# Patient Record
Sex: FEMALE | Race: WHITE | Employment: OTHER | URBAN - METROPOLITAN AREA
[De-identification: names, ages, dates, MRNs, and addresses within clinical notes are randomized per-mention and may not be internally consistent; named-entity substitution may affect disease eponyms.]

---

## 2018-09-11 ENCOUNTER — HOSPITAL ENCOUNTER (OUTPATIENT)
Dept: SURGERY | Age: 78
Discharge: HOME | End: 2018-09-11

## 2018-09-12 VITALS — BODY MASS INDEX: 25.83 KG/M2 | HEIGHT: 65 IN | WEIGHT: 155 LBS

## 2018-09-12 RX ORDER — CHOLECALCIFEROL (VITAMIN D3) 125 MCG
5 CAPSULE ORAL
COMMUNITY

## 2018-09-12 NOTE — PERIOP NOTES
Patient verified name, , and surgery as listed in [facility]. Type 1B surgery, PAT phone assessment complete. Orders were received. Labs per surgeon: none  Labs per anesthesia protocol: none    Last nephrologist office note requested medical records at [facility] nephrology. Patient answered medical/surgical history questions at their best of ability. All prior to admission medications documented in [facility]. Patient instructed to take the following medications the day of surgery according to anesthesia guidelines with a small sip of water: symbicort inhaler, norvasc, synthroid, prilosec and oxybutynin . Hold all vitamins 7 days prior to surgery and NSAIDS 5 days prior to surgery. Medications to be held- vitamins and supplements. Patient instructed on the following:  Arrive at A Entrance, time of arrival to be called the day before by 1700  NPO after midnight including gum, mints, and ice chips  Responsible adult must drive patient to the hospital, stay during surgery, and patient will need supervision 24 hours after anesthesia  Use antibacterial soap in shower the night before surgery and on the morning of surgery  Leave all valuables (money and jewelry) at home but bring insurance card and ID on  DOS  Do not wear make-up, nail polish, lotions, cologne, perfumes, powders, or oil on skin. Patient teach back successful and patient demonstrates knowledge of instruction.

## 2018-09-13 ENCOUNTER — ANESTHESIA EVENT (OUTPATIENT)
Dept: SURGERY | Age: 78
End: 2018-09-13
Payer: MEDICARE

## 2018-09-14 ENCOUNTER — ANESTHESIA (OUTPATIENT)
Dept: SURGERY | Age: 78
End: 2018-09-14
Payer: MEDICARE

## 2018-09-14 ENCOUNTER — HOSPITAL ENCOUNTER (OUTPATIENT)
Age: 78
Setting detail: OUTPATIENT SURGERY
Discharge: HOME | End: 2018-09-14
Attending: ORTHOPAEDIC SURGERY | Admitting: ORTHOPAEDIC SURGERY
Payer: MEDICARE

## 2018-09-14 VITALS
WEIGHT: 154 LBS | BODY MASS INDEX: 25.63 KG/M2 | DIASTOLIC BLOOD PRESSURE: 59 MMHG | TEMPERATURE: 97.3 F | SYSTOLIC BLOOD PRESSURE: 120 MMHG | RESPIRATION RATE: 14 BRPM | HEART RATE: 79 BPM | OXYGEN SATURATION: 95 %

## 2018-09-14 PROCEDURE — 77030004434 HC BUR RND STRY -B: Performed by: ORTHOPAEDIC SURGERY

## 2018-09-14 PROCEDURE — 77030006788 HC BLD SAW OSC STRY -B: Performed by: ORTHOPAEDIC SURGERY

## 2018-09-14 PROCEDURE — A4565 SLINGS: HCPCS | Performed by: ORTHOPAEDIC SURGERY

## 2018-09-14 PROCEDURE — 74011250636 HC RX REV CODE- 250/636: Performed by: ANESTHESIOLOGY

## 2018-09-14 PROCEDURE — 74011250636 HC RX REV CODE- 250/636

## 2018-09-14 PROCEDURE — 74011000250 HC RX REV CODE- 250

## 2018-09-14 PROCEDURE — 76060000034 HC ANESTHESIA 1.5 TO 2 HR: Performed by: ORTHOPAEDIC SURGERY

## 2018-09-14 PROCEDURE — C1713 ANCHOR/SCREW BN/BN,TIS/BN: HCPCS | Performed by: ORTHOPAEDIC SURGERY

## 2018-09-14 PROCEDURE — 77030008703 HC TU ET UNCUF COVD -A: Performed by: ANESTHESIOLOGY

## 2018-09-14 PROCEDURE — 77030039266 HC ADH SKN EXOFIN S2SG -A: Performed by: ORTHOPAEDIC SURGERY

## 2018-09-14 PROCEDURE — 76010000162 HC OR TIME 1.5 TO 2 HR INTENSV-TIER 1: Performed by: ORTHOPAEDIC SURGERY

## 2018-09-14 PROCEDURE — 77030039425 HC BLD LARYNG TRULITE DISP TELE -A: Performed by: ANESTHESIOLOGY

## 2018-09-14 PROCEDURE — 74011250637 HC RX REV CODE- 250/637: Performed by: ANESTHESIOLOGY

## 2018-09-14 PROCEDURE — 77030002933 HC SUT MCRYL J&J -A: Performed by: ORTHOPAEDIC SURGERY

## 2018-09-14 PROCEDURE — 77030020782 HC GWN BAIR PAWS FLX 3M -B: Performed by: ANESTHESIOLOGY

## 2018-09-14 PROCEDURE — 76210000021 HC REC RM PH II 0.5 TO 1 HR: Performed by: ORTHOPAEDIC SURGERY

## 2018-09-14 PROCEDURE — 77030018836 HC SOL IRR NACL ICUM -A: Performed by: ORTHOPAEDIC SURGERY

## 2018-09-14 PROCEDURE — 74011000250 HC RX REV CODE- 250: Performed by: ORTHOPAEDIC SURGERY

## 2018-09-14 PROCEDURE — 77030002913 HC SUT ETHBND J&J -B: Performed by: ORTHOPAEDIC SURGERY

## 2018-09-14 PROCEDURE — 76210000016 HC OR PH I REC 1 TO 1.5 HR: Performed by: ORTHOPAEDIC SURGERY

## 2018-09-14 PROCEDURE — 77030003602 HC NDL NRV BLK BBMI -B: Performed by: ANESTHESIOLOGY

## 2018-09-14 PROCEDURE — 77030031139 HC SUT VCRL2 J&J -A: Performed by: ORTHOPAEDIC SURGERY

## 2018-09-14 DEVICE — TWINFIX ULTRA 4.5 MM PK SUTURE                                    ANCHOR WITH TWO NO.2 ULTRABRAID                                    SUTURES BLUE, BLUE-COBRAID WITH NEEDLES
Type: IMPLANTABLE DEVICE | Site: SHOULDER | Status: FUNCTIONAL
Brand: TWINFIX

## 2018-09-14 RX ORDER — PROPOFOL 10 MG/ML
INJECTION, EMULSION INTRAVENOUS AS NEEDED
Status: DISCONTINUED | OUTPATIENT
Start: 2018-09-14 | End: 2018-09-14 | Stop reason: HOSPADM

## 2018-09-14 RX ORDER — LIDOCAINE HYDROCHLORIDE 20 MG/ML
INJECTION, SOLUTION EPIDURAL; INFILTRATION; INTRACAUDAL; PERINEURAL AS NEEDED
Status: DISCONTINUED | OUTPATIENT
Start: 2018-09-14 | End: 2018-09-14 | Stop reason: HOSPADM

## 2018-09-14 RX ORDER — BUPIVACAINE HYDROCHLORIDE AND EPINEPHRINE 5; 5 MG/ML; UG/ML
INJECTION, SOLUTION EPIDURAL; INTRACAUDAL; PERINEURAL AS NEEDED
Status: DISCONTINUED | OUTPATIENT
Start: 2018-09-14 | End: 2018-09-14 | Stop reason: HOSPADM

## 2018-09-14 RX ORDER — LIDOCAINE HYDROCHLORIDE 10 MG/ML
0.1 INJECTION INFILTRATION; PERINEURAL AS NEEDED
Status: DISCONTINUED | OUTPATIENT
Start: 2018-09-14 | End: 2018-09-14 | Stop reason: HOSPADM

## 2018-09-14 RX ORDER — MIDAZOLAM HYDROCHLORIDE 1 MG/ML
2 INJECTION, SOLUTION INTRAMUSCULAR; INTRAVENOUS
Status: COMPLETED | OUTPATIENT
Start: 2018-09-14 | End: 2018-09-14

## 2018-09-14 RX ORDER — ONDANSETRON 2 MG/ML
INJECTION INTRAMUSCULAR; INTRAVENOUS AS NEEDED
Status: DISCONTINUED | OUTPATIENT
Start: 2018-09-14 | End: 2018-09-14 | Stop reason: HOSPADM

## 2018-09-14 RX ORDER — SODIUM CHLORIDE 0.9 % (FLUSH) 0.9 %
5-10 SYRINGE (ML) INJECTION EVERY 8 HOURS
Status: DISCONTINUED | OUTPATIENT
Start: 2018-09-14 | End: 2018-09-14 | Stop reason: HOSPADM

## 2018-09-14 RX ORDER — ALBUTEROL SULFATE 0.83 MG/ML
2.5 SOLUTION RESPIRATORY (INHALATION) AS NEEDED
Status: DISCONTINUED | OUTPATIENT
Start: 2018-09-14 | End: 2018-09-14 | Stop reason: HOSPADM

## 2018-09-14 RX ORDER — DEXAMETHASONE SODIUM PHOSPHATE 4 MG/ML
INJECTION, SOLUTION INTRA-ARTICULAR; INTRALESIONAL; INTRAMUSCULAR; INTRAVENOUS; SOFT TISSUE AS NEEDED
Status: DISCONTINUED | OUTPATIENT
Start: 2018-09-14 | End: 2018-09-14 | Stop reason: HOSPADM

## 2018-09-14 RX ORDER — ONDANSETRON 2 MG/ML
4 INJECTION INTRAMUSCULAR; INTRAVENOUS
Status: DISCONTINUED | OUTPATIENT
Start: 2018-09-14 | End: 2018-09-14 | Stop reason: HOSPADM

## 2018-09-14 RX ORDER — HYDROMORPHONE HYDROCHLORIDE 2 MG/ML
0.5 INJECTION, SOLUTION INTRAMUSCULAR; INTRAVENOUS; SUBCUTANEOUS
Status: DISCONTINUED | OUTPATIENT
Start: 2018-09-14 | End: 2018-09-14 | Stop reason: HOSPADM

## 2018-09-14 RX ORDER — SODIUM CHLORIDE 0.9 % (FLUSH) 0.9 %
5-10 SYRINGE (ML) INJECTION AS NEEDED
Status: DISCONTINUED | OUTPATIENT
Start: 2018-09-14 | End: 2018-09-14 | Stop reason: HOSPADM

## 2018-09-14 RX ORDER — SODIUM CHLORIDE, SODIUM LACTATE, POTASSIUM CHLORIDE, CALCIUM CHLORIDE 600; 310; 30; 20 MG/100ML; MG/100ML; MG/100ML; MG/100ML
1000 INJECTION, SOLUTION INTRAVENOUS CONTINUOUS
Status: DISCONTINUED | OUTPATIENT
Start: 2018-09-14 | End: 2018-09-14 | Stop reason: HOSPADM

## 2018-09-14 RX ORDER — SUCCINYLCHOLINE CHLORIDE 20 MG/ML
INJECTION INTRAMUSCULAR; INTRAVENOUS AS NEEDED
Status: DISCONTINUED | OUTPATIENT
Start: 2018-09-14 | End: 2018-09-14 | Stop reason: HOSPADM

## 2018-09-14 RX ORDER — FENTANYL CITRATE 50 UG/ML
INJECTION, SOLUTION INTRAMUSCULAR; INTRAVENOUS AS NEEDED
Status: DISCONTINUED | OUTPATIENT
Start: 2018-09-14 | End: 2018-09-14 | Stop reason: HOSPADM

## 2018-09-14 RX ORDER — ROCURONIUM BROMIDE 10 MG/ML
INJECTION, SOLUTION INTRAVENOUS AS NEEDED
Status: DISCONTINUED | OUTPATIENT
Start: 2018-09-14 | End: 2018-09-14 | Stop reason: HOSPADM

## 2018-09-14 RX ORDER — ACETAMINOPHEN 500 MG
1000 TABLET ORAL ONCE
Status: COMPLETED | OUTPATIENT
Start: 2018-09-14 | End: 2018-09-14

## 2018-09-14 RX ADMIN — DEXAMETHASONE SODIUM PHOSPHATE 4 MG: 4 INJECTION, SOLUTION INTRA-ARTICULAR; INTRALESIONAL; INTRAMUSCULAR; INTRAVENOUS; SOFT TISSUE at 11:13

## 2018-09-14 RX ADMIN — LIDOCAINE HYDROCHLORIDE 0.1 ML: 10 INJECTION, SOLUTION INFILTRATION; PERINEURAL at 09:47

## 2018-09-14 RX ADMIN — SUCCINYLCHOLINE CHLORIDE 100 MG: 20 INJECTION INTRAMUSCULAR; INTRAVENOUS at 11:32

## 2018-09-14 RX ADMIN — ACETAMINOPHEN 1000 MG: 500 TABLET, FILM COATED ORAL at 09:47

## 2018-09-14 RX ADMIN — FENTANYL CITRATE 50 MCG: 50 INJECTION, SOLUTION INTRAMUSCULAR; INTRAVENOUS at 11:40

## 2018-09-14 RX ADMIN — LIDOCAINE HYDROCHLORIDE 100 MG: 20 INJECTION, SOLUTION EPIDURAL; INFILTRATION; INTRACAUDAL; PERINEURAL at 11:32

## 2018-09-14 RX ADMIN — SODIUM CHLORIDE, SODIUM LACTATE, POTASSIUM CHLORIDE, AND CALCIUM CHLORIDE: 600; 310; 30; 20 INJECTION, SOLUTION INTRAVENOUS at 11:35

## 2018-09-14 RX ADMIN — FENTANYL CITRATE 50 MCG: 50 INJECTION, SOLUTION INTRAMUSCULAR; INTRAVENOUS at 11:32

## 2018-09-14 RX ADMIN — MIDAZOLAM HYDROCHLORIDE 1 MG: 1 INJECTION, SOLUTION INTRAMUSCULAR; INTRAVENOUS at 11:05

## 2018-09-14 RX ADMIN — SODIUM CHLORIDE, SODIUM LACTATE, POTASSIUM CHLORIDE, AND CALCIUM CHLORIDE: 600; 310; 30; 20 INJECTION, SOLUTION INTRAVENOUS at 11:23

## 2018-09-14 RX ADMIN — SODIUM CHLORIDE, SODIUM LACTATE, POTASSIUM CHLORIDE, AND CALCIUM CHLORIDE 1000 ML: 600; 310; 30; 20 INJECTION, SOLUTION INTRAVENOUS at 09:47

## 2018-09-14 RX ADMIN — PROPOFOL 140 MG: 10 INJECTION, EMULSION INTRAVENOUS at 11:32

## 2018-09-14 RX ADMIN — ONDANSETRON 4 MG: 2 INJECTION INTRAMUSCULAR; INTRAVENOUS at 13:08

## 2018-09-14 RX ADMIN — ROCURONIUM BROMIDE 5 MG: 10 INJECTION, SOLUTION INTRAVENOUS at 11:32

## 2018-09-14 RX ADMIN — DEXAMETHASONE SODIUM PHOSPHATE 10 MG: 4 INJECTION, SOLUTION INTRA-ARTICULAR; INTRALESIONAL; INTRAMUSCULAR; INTRAVENOUS; SOFT TISSUE at 11:42

## 2018-09-14 NOTE — BRIEF OP NOTE
BRIEF OPERATIVE NOTE Date of Procedure: 9/14/2018 Preoperative Diagnosis: Complete tear of right rotator cuff [M75.121] Postoperative Diagnosis: Complete tear of right rotator cuff [M75.121] Procedure(s): RIGHT SHOULDER ACROMIOPLASTY ROTATOR CUFF REPAIR (OPEN) Surgeon(s) and Role: Hayde San MD - Primary Surgical Assistant:  
 
Surgical Staff: 
Circ-1: Rogelio Grey RN 
Circ-2: Radha Hunter RN 
Circ-Relief: Cyril Puente RN Scrub Tech-1: Vik Davis Scrub Tech-2: Tyshawn Gutierrez Scrub Tech-Relief: Leidy Mann Event Time In Incision Start [phone] Incision Close 1307 Anesthesia: General  
Estimated Blood Loss: <50cc Specimens: * No specimens in log * Findings: full thickness tear Complications: none Implants:  
Implant Name Type Inv. Item Serial No.  Lot No. LRB No. Used Action ANCHOR SUT TWNFX 4.5 ULTR W/2 -- Rezachery Magallanes - [id number]  ANCHOR SUT TWNFX 4.5 ULTR W/2 -- Muna Magallanes [id number] [employer] [id number] Right 1 Implanted ANCHOR SUT TWNFX 4.5 ULTR W/2 -- Muna Magallanes - [id number]   ANCHOR SUT TWNFX 4.5 ULTR W/2 -- Muna Magallanes [id number] [employer] [id number] Right 1 Implanted

## 2018-09-14 NOTE — ANESTHESIA PREPROCEDURE EVALUATION
Anesthetic History Comments: Slow awakening Reaction to local in dentists office, sounds like might have gotten some intravenously. Review of Systems / Medical History Patient summary reviewed and pertinent labs reviewed Pulmonary Asthma : well controlled Neuro/Psych Cardiovascular Hypertension: well controlled Exercise tolerance: >4 METS 
  
GI/Hepatic/Renal 
  
GERD: well controlled Renal disease: CRI Endo/Other Hypothyroidism: well controlled Arthritis Other Findings Comments: Sjogrens syndrome Physical Exam 
 
Airway Mallampati: I 
TM Distance: 4 - 6 cm Neck ROM: normal range of motion Mouth opening: Normal 
 
 Cardiovascular Regular rate and rhythm,  S1 and S2 normal,  no murmur, click, rub, or gallop Rhythm: regular Rate: normal 
 
 
 
 Dental 
No notable dental hx Pulmonary Breath sounds clear to auscultation Abdominal 
 
 
 
 Other Findings Anesthetic Plan ASA: 2 Anesthesia type: general - saphenous block Post-op pain plan if not by surgeon: peripheral nerve block single Induction: Intravenous Anesthetic plan and risks discussed with: Patient and Spouse

## 2018-09-14 NOTE — ANESTHESIA PROCEDURE NOTES
Peripheral Block Start time: 9/14/2018 11:05 AM 
End time: 9/14/2018 11:13 AM 
Performed by: Adam Maynard Authorized by: Adam Maynard Pre-procedure: Indications: at surgeon's request and post-op pain management Preanesthetic Checklist: patient identified, risks and benefits discussed, site marked, timeout performed, anesthesia consent given and patient being monitored Timeout Time: 11:05 Block Type:  
Block Type: Interscalene Laterality:  Right Monitoring:  Standard ASA monitoring, responsive to questions, oxygen, continuous pulse ox, frequent vital sign checks and heart rate Injection Technique:  Single shot Procedures: ultrasound guided and nerve stimulator Patient Position: seated (lateral decubitus) Prep: chlorhexidine Location:  Interscalene Needle Type:  Stimuplex Needle Gauge:  22 G Needle Localization:  Nerve stimulator and ultrasound guidance Motor Response: minimal motor response >0.4 mA Medication Injected:  0.5% 
ropivacaine Adds:  Epi 1:200K Volume (mL):  30 
 
Assessment: 
Number of attempts:  1 Injection Assessment:  Incremental injection every 5 mL, local visualized surrounding nerve on ultrasound, negative aspiration for blood, no intravascular symptoms, no paresthesia and ultrasound image on chart Patient tolerance:  Patient tolerated the procedure well with no immediate complications All needles out intact, proc. Tolerated well.

## 2018-09-14 NOTE — ANESTHESIA POSTPROCEDURE EVALUATION
Post-Anesthesia Evaluation and Assessment Patient: Rosalee Aggarwal MRN: [id number]  SSN: [id number] YOB: 1940  Age: 69 y.o. Sex: female Cardiovascular Function/Vital Signs Visit Vitals  /57 (BP 1 Location: Left arm, BP Patient Position: At rest)  Pulse 83  Temp 36.3 °C (97.3 °F)  Resp 14  Wt 69.9 kg (154 lb)  SpO2 94%  BMI 25.63 kg/m2 Patient is status post general anesthesia for Procedure(s): RIGHT SHOULDER ACROMIOPLASTY ROTATOR CUFF REPAIR (OPEN). Nausea/Vomiting: None Postoperative hydration reviewed and adequate. Pain: 
Pain Scale 1: Numeric (0 - 10) (09/14/18 1404) Pain Intensity 1: 0 (09/14/18 1404) Managed Neurological Status:  
Neuro (WDL): Exceptions to WDL (09/14/18 1404) Neuro Neurologic State: Drowsy (09/14/18 1404) LUE Motor Response: Purposeful (09/14/18 1404) LLE Motor Response: Purposeful (09/14/18 1404) RUE Motor Response: Pharmocologically paralyzed (09/14/18 1404) RLE Motor Response: Purposeful (09/14/18 1404) At baseline Mental Status and Level of Consciousness: Arousable Pulmonary Status:  
O2 Device: Nasal cannula (09/14/18 1404) Adequate oxygenation and airway patent Complications related to anesthesia: None Post-anesthesia assessment completed. No concerns. Good result with interscalene nerve block. Signed By: Trudi Morales MD   
 September 14, 2018

## 2018-09-14 NOTE — DISCHARGE INSTRUCTIONS
Rotator Cuff Repair Discharge Instructions    ACTIVITY:   - Limit activity today: increase activity tomorrow, but no vigorous activity. As tolerated and as directed by your doctor  - Use arm sling or swathe for 6 weeks  - Bath or shower is OK  - Ice pack to area as needed for up to 5 days      DIET:  - Clear liquids until no nausea or vomiting  - Advance to regular diet as tolerated  - If nausea and vomiting occurs,use the phenergan prescription you were given by Dr Nikki Saunders. If the phenergan doesn't work please call our doctor on call. (Call [phone] if it  is after regular office hours)        PAIN:  - Expect a moderate amount of pain  - Take pain medication(s) as directed  - Call your doctor if pain is NOT relieved by medication (if it is after normal office hours call [phone])  - DO NOT take aspirin or blood thinners until directed by your doctor    DRESSING CARE:  The surgical glue on your wound may be treated like normal skin. It is OK to wash the wound. It is fine to get it wet. CALL YOUR DOCTOR IF:  - Excessive bleeding that does not stop after holding mild pressure over the area for 15 minutes  - Any color, temperature, or sensation changes in the operative arm/hand  - Temperature of 101°F or greater  - Green or yellow, smelly discharge from incision  - Nausea or vomiting that does not stop by bedtime    AFTER ANESTHESIA:  - For the next 24 hours: DO NOT drive, drink alcoholic beverages, or make important decisions  - Be aware of dizziness following anesthesia and while taking pain medication(s)  - Plan to stay tonight within 1 hour's drive of the hospital    MEDICATIONS:  Continue home medications as previously prescribed with the following changes: none.       Signed:  Aaron Urbano MD  9/14/2018  1:16 PM

## 2018-09-14 NOTE — IP AVS SNAPSHOT
[address] [place] [address] 
[phone] Patient: David López MRN: [id number] [facility]:[birth year]25 About your hospitalization You were admitted on:  September 14, 2018 You last received care in the:  [facility] PACU You were discharged on:  September 14, 2018 Why you were hospitalized Your primary diagnosis was:  Not on File Follow-up Information Follow up With Details Comments Contact Info Marlene Zamora MD   [address] [facility] [address] 
[phone] Aaron Urbano MD   [address] [address] 
[phone] Discharge Orders None A check ally indicates which time of day the medication should be taken. My Medications CONTINUE taking these medications Instructions Each Dose to Equal  
 Morning Noon Evening Bedtime  
 amLODIPine 5 mg tablet Commonly known as:  Uma Huddleston Your last dose was: Your next dose is: Take 5 mg by mouth daily. 5 mg  
    
   
   
   
  
 budesonide-formoterol 80-4.5 mcg/actuation Hfaa Commonly known as:  SYMBICORT Your last dose was: Your next dose is: Take 2 Puffs by inhalation two (2) times daily as needed. 2 Puff  
    
   
   
   
  
 coenzyme q10 10 mg Cap Your last dose was: Your next dose is: Take 1 Tab by mouth daily. 1 Tab COZAAR 100 mg tablet Generic drug:  losartan Your last dose was: Your next dose is: Take 50 mg by mouth daily. 50 mg  
    
   
   
   
  
 cyanocobalamin 2,500 mcg sublingual tablet Commonly known as:  VITAMIN B12 Your last dose was: Your next dose is: Take 2,500 mcg by mouth daily. 2500 mcg  
    
   
   
   
  
 fluticasone 50 mcg/actuation nasal spray Commonly known as:  Jet Ache Your last dose was: Your next dose is: 2 Sprays by Both Nostrils route daily as needed. 2 Spray  
    
   
   
   
  
 guaiFENesin  mg ER tablet Commonly known as:  Krystian & Krystian Your last dose was: Your next dose is: Take 600 mg by mouth daily as needed. 600 mg  
    
   
   
   
  
 levothyroxine 75 mcg tablet Commonly known as:  SYNTHROID Your last dose was: Your next dose is: Take  by mouth Daily (before breakfast). Take morning of surgery per anesthesia guidelines. Indications: HYPOTHYROIDISM  
     
   
   
   
  
 magnesium 250 mg Tab Your last dose was: Your next dose is: Take 1 Tab by mouth daily. 1 Tab  
    
   
   
   
  
 melatonin Tab tablet Your last dose was: Your next dose is: Take 5 mg by mouth nightly. 5 mg MUCINEX -30 mg per tablet Generic drug:  guaiFENesin-dextromethorphan SR Your last dose was: Your next dose is: Take 1 Tab by mouth every twelve (12) hours as needed. 1 Tab  
    
   
   
   
  
 olopatadine 0.1 % ophthalmic solution Commonly known as:  PATANOL Your last dose was: Your next dose is:    
   
   
 Administer 1 Drop to both eyes two (2) times daily as needed. 1 Drop  
    
   
   
   
  
 omeprazole 40 mg capsule Commonly known as:  PRILOSEC Your last dose was: Your next dose is: Take 40 mg by mouth daily. 40 mg  
    
   
   
   
  
 TYLENOL EXTRA STRENGTH 500 mg tablet Generic drug:  acetaminophen Your last dose was: Your next dose is: Take 650 mg by mouth every six (6) hours as needed for Pain. 650 mg VENTOLIN HFA 90 mcg/actuation inhaler Generic drug:  albuterol Your last dose was: Your next dose is: Take 2 Puffs by inhalation as needed for Wheezing.  Take morning of surgery per anesthesia guidelines  Bring to hospital   Indications: ACUTE ASTHMA ATTACK 2 Puff VITAMIN D3 2,000 unit Tab Generic drug:  cholecalciferol (vitamin D3) Your last dose was: Your next dose is: Take 1 Tab by mouth daily. 1 Tab VOLTAREN 1 % Gel Generic drug:  diclofenac Your last dose was: Your next dose is:    
   
   
 Apply 1 application topically daily as needed ZyrTEC 10 mg Cap Generic drug:  Cetirizine Your last dose was: Your next dose is: Take 1 Tab by mouth daily as needed. 1 Tab ASK your doctor about these medications Instructions Each Dose to Equal  
 Morning Noon Evening Bedtime  
 oxybutynin chloride XL 15 mg CR tablet Commonly known as:  DITROPAN XL Your last dose was: Your next dose is: Take 5 mg by mouth two (2) times a day. 5 mg  
    
   
   
   
  
 * SYSTANE (PROPYLENE GLYCOL) 0.4-0.3 % Drop Generic drug:  peg 400-propylene glycol Your last dose was: Your next dose is:    
   
   
 Administer 1 Drop to left eye as needed. Indications: Dry Eye  
 1 Drop * SYSTANE (PROPYLENE GLYCOL) 0.4-0.3 % Drop Generic drug:  peg 400-propylene glycol Your last dose was: Your next dose is:    
   
   
 Administer  to left eye as needed. turmeric-herbal complex no. 278 150 mg Cap Your last dose was: Your next dose is: Take 4 Tabs by mouth daily. 4 Tab * Notice: This list has 2 medication(s) that are the same as other medications prescribed for you. Read the directions carefully, and ask your doctor or other care provider to review them with you. Discharge Instructions Rotator Cuff Repair Discharge Instructions ACTIVITY:  
 - Limit activity today: increase activity tomorrow, but no vigorous activity. As tolerated and as directed by your doctor - Use arm sling or swathe for 6 weeks - Bath or shower is OK 
- Ice pack to area as needed for up to 5 days DIET: 
- Clear liquids until no nausea or vomiting - Advance to regular diet as tolerated - If nausea and vomiting occurs,use the phenergan prescription you were given by Dr Santi Canales. If the phenergan doesn't work please call our doctor on call. (Call [phone] if it  is after regular office hours) PAIN: 
- Expect a moderate amount of pain - Take pain medication(s) as directed - Call your doctor if pain is NOT relieved by medication (if it is after normal office hours call [phone]) 
- DO NOT take aspirin or blood thinners until directed by your doctor DRESSING CARE: 
The surgical glue on your wound may be treated like normal skin. It is OK to wash the wound. It is fine to get it wet. CALL YOUR DOCTOR IF: 
- Excessive bleeding that does not stop after holding mild pressure over the area for 15 minutes - Any color, temperature, or sensation changes in the operative arm/hand - Temperature of 101°F or greater - Green or yellow, smelly discharge from incision 
- Nausea or vomiting that does not stop by bedtime AFTER ANESTHESIA: 
- For the next 24 hours: DO NOT drive, drink alcoholic beverages, or make important decisions - Be aware of dizziness following anesthesia and while taking pain medication(s) - Plan to stay tonight within 1 [address] drive of the hospital 
 
MEDICATIONS: 
Continue home medications as previously prescribed with the following changes: none. Signed: Yoko Daugherty MD 
9/14/2018 
1:16 PM 
 
 
  
  
  
Introducing [facility]! Maira Moncada introduces [employer] patient portal. Now you can access parts of your medical record, email your doctor's office, and request medication refills online.    
 
1. In your internet browser, go to [email or web address] 2. Click on the First Time User? Click Here link in the Sign In box. You will see the New Member Sign Up page. 3. Enter your fitmob Access Code exactly as it appears below. You will not need to use this code after youve completed the sign-up process. If you do not sign up before the expiration date, you must request a new code. · fitmob Access Code: [email or web address] Expires: 11/15/2018  1:56 PM 
 
4. Enter the last four digits of your Social Security Number (xxxx) and Date of Birth (mm/dd/yyyy) as indicated and click Submit. You will be taken to the next sign-up page. 5. Create a Syntricityt ID. This will be your fitmob login ID and cannot be changed, so think of one that is secure and easy to remember. 6. Create a fitmob password. You can change your password at any time. 7. Enter your Password Reset Question and Answer. This can be used at a later time if you forget your password. 8. Enter your e-mail address. You will receive e-mail notification when new information is available in [address] 9. Click Sign Up. You can now view and download portions of your medical record. 10. Click the Download Summary menu link to download a portable copy of your medical information. If you have questions, please visit the Frequently Asked Questions section of the fitmob website. Remember, fitmob is NOT to be used for urgent needs. For medical emergencies, dial 911. Now available from your iPhone and Android! Introducing Lee Smith As a [facility] patient, I wanted to make you aware of our electronic visit tool called Lee Smith. [facility] 24/7 allows you to connect within minutes with a medical provider 24 hours a day, seven days a week via a mobile device or tablet or logging into a secure website from your computer. You can access Lee Smith from anywhere in the [place]. A virtual visit might be right for you when you have a simple condition and feel like you just dont want to get out of bed, or cant get away from work for an appointment, when your regular [employer] provider is not available (evenings, weekends or holidays), or when youre out of town and need minor care. Electronic visits cost only $49 and if the [employer] 24/7 provider determines a prescription is needed to treat your condition, one can be electronically transmitted to a nearby pharmacy*. Please take a moment to enroll today if you have not already done so. The enrollment process is free and takes just a few minutes. To enroll, please download the [employer] 24/7 maria eugenia to your tablet or phone, or visit [email or web address] to enroll on your computer. And, as an [address] [place] [address] patient with a [facility] account, the results of your visits will be scanned into your electronic medical record and your primary care provider will be able to view the scanned results. We urge you to continue to see your regular [employer] provider for your ongoing medical care. And while your primary care provider may not be the one available when you seek a [employer]arlan[employer] virtual visit, the peace of mind you get from getting a real diagnosis real time can be priceless. For more information on Frog[employer]sharlanfin, view our Frequently Asked Questions (FAQs) at [email or web address]. Sincerely, 
 
Sasha Lynn MD 
Chief Medical Officer Luis Hortensia Denise *:  certain medications cannot be prescribed via Frog[employer]sharlanfin Providers Seen During Your Hospitalization Provider Specialty Primary office phone Brielle Driscoll MD Orthopedic Surgery [phone] Your Primary Care Physician (PCP) Primary Care Physician Office Phone Office Fax [address] [facility] [phone] You are allergic to the following Allergen Reactions Epinephrine Drowsiness In and out of consciousness Septocaine (Articaine-Epinephrine) Drowsiness In and out of consciousness Prednisone Swelling Tongue swelling Tetanus Vaccines And Toxoid Swelling Venom-Honey Bee Swelling Recent Documentation Weight BMI OB Status Smoking Status 69.9 kg 25.63 kg/m2 Postmenopausal Former Smoker Emergency Contacts Name Discharge Info Relation Home Work Mobile [address] [place] [address],  [address] CAREGIVER [3] Spouse [3] [phone] Patient Belongings The following personal items are in your possession at time of discharge: 
  Dental Appliances: None Please provide this summary of care documentation to your next provider. Signatures-by signing, you are acknowledging that this After Visit Summary has been reviewed with you and you have received a copy. Patient Signature:  ____________________________________________________________ Date:  ____________________________________________________________  
  
[facility] Provider Signature:  ____________________________________________________________ Date:  ____________________________________________________________

## 2018-10-05 NOTE — OP NOTES
[facility] REPORT    Susy Kaur  MR#: [id number]  : 1940  ACCOUNT #: [de-identified]   DATE OF SERVICE: 2018    PREOPERATIVE DIAGNOSIS:  Rotator cuff tear, right shoulder. POSTOPERATIVE DIAGNOSIS:  Rotator cuff tear, right shoulder. PROCEDURES PERFORMED:  1. Right rotator cuff repair. 2.  Open acromioplasty. SURGEON:  Jason May MD    ASSISTANT:      ANESTHESIA:       ESTIMATED BLOOD LOSS:  <30cc     SPECIMENS REMOVED:  None     COMPLICATIONS:  none     IMPLANTS:      FINDINGS:  Her articular surfaces looked normal for age. The tear advanced without undue tension to the normal footprint insertion. PROCEDURE IN DETAIL:  In the operating room she was anesthetized. In the beach chair position her right shoulder was prepped and draped in a sterile fashion. A lateral acromial incision was made through the subcutaneous fat layer. The interval between the anterior and middle third of the deltoid was subperiosteally elevated off the acromion. A power saw was used to create a flat undersurface of the acromion. Most of the subacromial bursa was removed. The distal stump of tendon was removed with a knife, and a bur was used to create a bare surface of bleeding bone at the normal footprint insertion. The biceps tendon and intra-articular contents were inspected. The labrum was well attached. With the arm at the side and the forearm across the abdomen, 2 suture anchors were placed at the edge of the articular surface. These were used to suture the rotator cuff tendon back to its normal footprint insertion, with knots tied on top. Next, multiple simple interrupted transosseous sutures were placed lateral to that, creating a watertight double row repair. The deltoid was then reapproximated with transosseous Ethibond sutures. The superficial deltoid fascia was closed with running Vicryl.   The subcutaneous was closed with interrupted Monocryl, subcuticular Monocryl, and surgical glue on the skin. She tolerated the procedure well, with the blood loss estimated at less than 30 mL. There were no specimens sent. She was placed in a shoulder immobilizer, and then sent to the recovery room.       MD Jim Gutierrez / Rich.Isha  D: 10/05/2018 14:20     T: 10/05/2018 19:56  JOB #: [id number]  CC: Meagan Jones MD

## 2018-10-28 ENCOUNTER — HOSPITAL ENCOUNTER (EMERGENCY)
Age: 78
Discharge: HOME | End: 2018-10-28
Attending: EMERGENCY MEDICINE
Payer: MEDICARE

## 2018-10-28 ENCOUNTER — APPOINTMENT (OUTPATIENT)
Dept: GENERAL RADIOLOGY | Age: 78
End: 2018-10-28
Attending: EMERGENCY MEDICINE
Payer: MEDICARE

## 2018-10-28 VITALS
HEART RATE: 81 BPM | BODY MASS INDEX: 24.11 KG/M2 | RESPIRATION RATE: 16 BRPM | SYSTOLIC BLOOD PRESSURE: 133 MMHG | HEIGHT: 66 IN | TEMPERATURE: 98 F | WEIGHT: 150 LBS | DIASTOLIC BLOOD PRESSURE: 69 MMHG | OXYGEN SATURATION: 96 %

## 2018-10-28 DIAGNOSIS — K21.9 GASTROESOPHAGEAL REFLUX DISEASE WITHOUT ESOPHAGITIS: ICD-10-CM

## 2018-10-28 DIAGNOSIS — R09.1 PLEURISY: Primary | ICD-10-CM

## 2018-10-28 LAB
ALBUMIN SERPL-MCNC: 3.4 G/DL (ref 3.2–4.6)
ALBUMIN/GLOB SERPL: 0.7 {RATIO}
ALP SERPL-CCNC: 108 U/L (ref 50–130)
ALT SERPL-CCNC: 22 U/L (ref 12–65)
ANION GAP SERPL CALC-SCNC: 8 MMOL/L
AST SERPL-CCNC: 23 U/L (ref 15–37)
ATRIAL RATE: 81 BPM
BILIRUB SERPL-MCNC: 0.3 MG/DL (ref 0.2–1.1)
BNP SERPL-MCNC: 103 PG/ML
BUN SERPL-MCNC: 21 MG/DL (ref 8–23)
CALCIUM SERPL-MCNC: 9.2 MG/DL (ref 8.3–10.4)
CALCULATED P AXIS, ECG09: 61 DEGREES
CALCULATED R AXIS, ECG10: 73 DEGREES
CALCULATED T AXIS, ECG11: 74 DEGREES
CHLORIDE SERPL-SCNC: 98 MMOL/L (ref 98–107)
CO2 SERPL-SCNC: 27 MMOL/L (ref 21–32)
CREAT SERPL-MCNC: 1.05 MG/DL (ref 0.6–1)
DIAGNOSIS, 93000: NORMAL
ERYTHROCYTE [DISTWIDTH] IN BLOOD BY AUTOMATED COUNT: 13.7 %
GLOBULIN SER CALC-MCNC: 4.9 G/DL (ref 2.3–3.5)
GLUCOSE SERPL-MCNC: 103 MG/DL (ref 65–100)
HCT VFR BLD AUTO: 37.2 % (ref 35.8–46.3)
HGB BLD-MCNC: 11.6 G/DL (ref 11.7–15.4)
INR PPP: 1
LIPASE SERPL-CCNC: 181 U/L (ref 73–393)
MAGNESIUM SERPL-MCNC: 1.8 MG/DL (ref 1.8–2.4)
MCH RBC QN AUTO: 29.2 PG (ref 26.1–32.9)
MCHC RBC AUTO-ENTMCNC: 31.2 G/DL (ref 31.4–35)
MCV RBC AUTO: 93.7 FL (ref 79.6–97.8)
NRBC # BLD: 0 K/UL (ref 0–0.2)
P-R INTERVAL, ECG05: 152 MS
PLATELET # BLD AUTO: 284 K/UL (ref 150–450)
PMV BLD AUTO: 8.9 FL (ref 9.4–12.3)
POTASSIUM SERPL-SCNC: 4.3 MMOL/L (ref 3.5–5.1)
PROT SERPL-MCNC: 8.3 G/DL
PROTHROMBIN TIME: 12.9 SEC (ref 11.5–14.5)
Q-T INTERVAL, ECG07: 356 MS
QRS DURATION, ECG06: 102 MS
QTC CALCULATION (BEZET), ECG08: 413 MS
RBC # BLD AUTO: 3.97 M/UL (ref 4.05–5.2)
SODIUM SERPL-SCNC: 133 MMOL/L (ref 136–145)
TROPONIN I BLD-MCNC: 0 NG/ML (ref 0.02–0.05)
TROPONIN I BLD-MCNC: 0.02 NG/ML (ref 0.02–0.05)
VENTRICULAR RATE, ECG03: 81 BPM
WBC # BLD AUTO: 9.5 K/UL (ref 4.3–11.1)

## 2018-10-28 PROCEDURE — 96374 THER/PROPH/DIAG INJ IV PUSH: CPT | Performed by: EMERGENCY MEDICINE

## 2018-10-28 PROCEDURE — 83690 ASSAY OF LIPASE: CPT

## 2018-10-28 PROCEDURE — 74011250637 HC RX REV CODE- 250/637: Performed by: EMERGENCY MEDICINE

## 2018-10-28 PROCEDURE — 85610 PROTHROMBIN TIME: CPT

## 2018-10-28 PROCEDURE — 71045 X-RAY EXAM CHEST 1 VIEW: CPT

## 2018-10-28 PROCEDURE — 83880 ASSAY OF NATRIURETIC PEPTIDE: CPT

## 2018-10-28 PROCEDURE — 93005 ELECTROCARDIOGRAM TRACING: CPT | Performed by: EMERGENCY MEDICINE

## 2018-10-28 PROCEDURE — 83735 ASSAY OF MAGNESIUM: CPT

## 2018-10-28 PROCEDURE — 84484 ASSAY OF TROPONIN QUANT: CPT

## 2018-10-28 PROCEDURE — 99285 EMERGENCY DEPT VISIT HI MDM: CPT | Performed by: EMERGENCY MEDICINE

## 2018-10-28 PROCEDURE — 96375 TX/PRO/DX INJ NEW DRUG ADDON: CPT | Performed by: EMERGENCY MEDICINE

## 2018-10-28 PROCEDURE — 80053 COMPREHEN METABOLIC PANEL: CPT

## 2018-10-28 PROCEDURE — 74011000250 HC RX REV CODE- 250: Performed by: EMERGENCY MEDICINE

## 2018-10-28 PROCEDURE — 74011250636 HC RX REV CODE- 250/636: Performed by: EMERGENCY MEDICINE

## 2018-10-28 PROCEDURE — 85027 COMPLETE CBC AUTOMATED: CPT

## 2018-10-28 RX ORDER — SUCRALFATE 1 G/1
1 TABLET ORAL 4 TIMES DAILY
Qty: 40 TAB | Refills: 0 | Status: SHIPPED | OUTPATIENT
Start: 2018-10-28

## 2018-10-28 RX ORDER — METOCLOPRAMIDE HYDROCHLORIDE 5 MG/ML
5 INJECTION INTRAMUSCULAR; INTRAVENOUS
Status: COMPLETED | OUTPATIENT
Start: 2018-10-28 | End: 2018-10-28

## 2018-10-28 RX ORDER — TRAMADOL HYDROCHLORIDE 50 MG/1
100 TABLET ORAL
Qty: 19 TAB | Refills: 0 | Status: SHIPPED | OUTPATIENT
Start: 2018-10-28 | End: 2019-11-20

## 2018-10-28 RX ORDER — PREDNISONE 20 MG/1
40 TABLET ORAL DAILY
Qty: 10 TAB | Refills: 0 | Status: SHIPPED | OUTPATIENT
Start: 2018-10-28 | End: 2018-11-02

## 2018-10-28 RX ORDER — SODIUM CHLORIDE 0.9 % (FLUSH) 0.9 %
5-10 SYRINGE (ML) INJECTION EVERY 8 HOURS
Status: DISCONTINUED | OUTPATIENT
Start: 2018-10-28 | End: 2018-10-28 | Stop reason: HOSPADM

## 2018-10-28 RX ORDER — KETOROLAC TROMETHAMINE 30 MG/ML
15 INJECTION, SOLUTION INTRAMUSCULAR; INTRAVENOUS
Status: COMPLETED | OUTPATIENT
Start: 2018-10-28 | End: 2018-10-28

## 2018-10-28 RX ORDER — SODIUM CHLORIDE 0.9 % (FLUSH) 0.9 %
5-10 SYRINGE (ML) INJECTION AS NEEDED
Status: DISCONTINUED | OUTPATIENT
Start: 2018-10-28 | End: 2018-10-28 | Stop reason: HOSPADM

## 2018-10-28 RX ADMIN — KETOROLAC TROMETHAMINE 15 MG: 30 INJECTION, SOLUTION INTRAMUSCULAR at 10:15

## 2018-10-28 RX ADMIN — METOCLOPRAMIDE 5 MG: 5 INJECTION, SOLUTION INTRAMUSCULAR; INTRAVENOUS at 10:33

## 2018-10-28 RX ADMIN — LIDOCAINE HYDROCHLORIDE 40 ML: 20 SOLUTION ORAL; TOPICAL at 10:33

## 2018-10-28 NOTE — ED PROVIDER NOTES
The history is provided by the patient and the spouse. Chest Pain This is a new problem. The current episode started 2 days ago. The problem has been gradually worsening. The problem occurs constantly. Associated with: unknown trigger. Pain location: diffuse. The pain is severe. The quality of the pain is described as pressure-like. The pain radiates to the upper back and mid back. The symptoms are aggravated by deep breathing and certain positions. Associated symptoms include back pain, malaise/fatigue and shortness of breath. Pertinent negatives include no abdominal pain, no cough, no dizziness, no fever, no headaches, no hemoptysis, no leg pain, no nausea, no palpitations, no vomiting and no weakness. She has tried nothing for the symptoms. Her past medical history is significant for HTN. Her past medical history does not include DM. Procedural history includes echocardiogram.Pertinent negatives include no cardiac catheterization. Past Medical History:  
Diagnosis Date  Adverse effect of anesthesia   
 slow waking  Arthritis  Asthma MDI prn, last usage 9/10/18  Chronic kidney disease   
 stage III, Followed by nephrologist- Dr. Kelsea Glynn  Fibromyalgia  GERD (gastroesophageal reflux disease)   
 controlled with med  History of tobacco abuse 1/2 ppd x 40 years, quit 2007  Hypertension   
 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   
 hypo-med Past Surgical History:  
Procedure Laterality Date  HX KNEE REPLACEMENT Left 2016  HX ORTHOPAEDIC  2007  
 right NORI; left and right  HX OTHER SURGICAL    
 carpal tunnel, bunionectomy  HX TUBAL LIGATION  1991  
 SHOULDER SURG PROC UNLISTED    
 rotator cuff Family History:  
Problem Relation Age of Onset  Lung Disease Mother  Heart Disease Mother  Cancer Father  Hypertension Father Social History Socioeconomic History  Marital status:  Spouse name: Not on file  Number of children: Not on file  Years of education: Not on file  Highest education level: Not on file Social Needs  Financial resource strain: Not on file  Food insecurity - worry: Not on file  Food insecurity - inability: Not on file  Transportation needs - medical: Not on file  Transportation needs - non-medical: Not on file Occupational History  Not on file Tobacco Use  Smoking status: Former Smoker Packs/day: 1.00 Years: 40.00 Pack years: 40.00 Last attempt to quit: 9/12/2008 Years since quitting: 10.1  Smokeless tobacco: Never Used Substance and Sexual Activity  Alcohol use: Yes Comment: occ  Drug use: No  
 Sexual activity: Not on file Other Topics Concern  Not on file Social History Narrative ** Merged History Encounter **  
    
 , lives with . Worked in sales and in hospital as . ALLERGIES: Epinephrine; Septocaine [articaine-epinephrine]; Prednisone; Tetanus vaccines and toxoid; and Venom-honey bee Review of Systems Constitutional: Positive for malaise/fatigue. Negative for chills and fever. HENT: Negative for congestion, ear pain and rhinorrhea. Eyes: Negative for photophobia and discharge. Respiratory: Positive for shortness of breath. Negative for cough and hemoptysis. Cardiovascular: Positive for chest pain. Negative for palpitations. Gastrointestinal: Negative for abdominal pain, constipation, diarrhea, nausea and vomiting. Endocrine: Negative for cold intolerance and heat intolerance. Genitourinary: Negative for dysuria and flank pain. Musculoskeletal: Positive for back pain. Negative for arthralgias, myalgias and neck pain. Skin: Negative for rash and wound. Allergic/Immunologic: Negative for environmental allergies and food allergies. Neurological: Negative for dizziness, syncope, weakness and headaches. Hematological: Negative for adenopathy. Does not bruise/bleed easily. Psychiatric/Behavioral: Negative for dysphoric mood. The patient is not nervous/anxious. All other systems reviewed and are negative. Vitals:  
 10/28/18 0361 BP: 147/67 Pulse: 84 Resp: 16 Temp: 97.7 °F (36.5 °C) SpO2: 98% Weight: 68 kg (150 lb) Height: 5' 6\" (1.676 m) Physical Exam  
Constitutional: She is oriented to person, place, and time. She appears well-developed and well-nourished. She appears distressed. HENT:  
Head: Normocephalic and atraumatic. Right Ear: External ear normal.  
Left Ear: External ear normal.  
Mouth/Throat: Oropharynx is clear and moist. No oropharyngeal exudate. Eyes: Conjunctivae and EOM are normal. Pupils are equal, round, and reactive to light. Neck: Normal range of motion. Neck supple. No JVD present. Cardiovascular: Normal rate, regular rhythm, normal heart sounds and intact distal pulses. Exam reveals no gallop and no friction rub. No murmur heard. Costochondral tenderness to palpation Pulmonary/Chest: Effort normal and breath sounds normal.  
Abdominal: Soft. Normal appearance and bowel sounds are normal. She exhibits no distension and no mass. There is no hepatosplenomegaly. There is no tenderness. Musculoskeletal: Normal range of motion. She exhibits no edema or deformity. Neurological: She is alert and oriented to person, place, and time. She has normal strength. No cranial nerve deficit or sensory deficit. She displays a negative Romberg sign. Gait normal.  
Skin: Skin is warm and dry. Capillary refill takes less than 2 seconds. No rash noted. Psychiatric: She has a normal mood and affect.  Her speech is normal and behavior is normal. Judgment and thought content normal. Cognition and memory are normal.  
Nursing note and vitals reviewed. MDM Number of Diagnoses or Management Options Gastroesophageal reflux disease without esophagitis: established and worsening Pleurisy: new and requires workup Diagnosis management comments: EKG reviewed Sinus rhythm, normal intervals. Normal axisno ectopy No acute ischemic changes No interval change noted from 2015 
 
528 AM 
50-year-old female presents with 2+ to a history of \"crushing\" heaviness in her chest 
Patient has noticed worsening symptoms with taking a deep breath, as well as bending over Denies prior episodes No recent respiratory issues. Does have a history of bundle branch block, which is been followed by [place] [facility] Their notes also reveal an recent echo with a low, but normal EF and no significant valvular disease. 11:51 AM 
Patient improved after medications. I will send the patient home on steroids, Carafate, Ultram for more severe pain They plan to see their family doctor tomorrow for recheck. I have also strongly encouraged him to make contact with her GI physician in order to get a recheck visit with them. Amount and/or Complexity of Data Reviewed Clinical lab tests: ordered and reviewed Tests in the radiology section of CPT®: ordered Tests in the medicine section of CPT®: ordered and reviewed Obtain history from someone other than the patient: yes Review and summarize past medical records: yes Independent visualization of images, tracings, or specimens: yes Risk of Complications, Morbidity, and/or Mortality Presenting problems: moderate Diagnostic procedures: moderate Management options: moderate General comments: Elements of this note have been dictated via voice recognition software. Text and phrases may be limited by the accuracy of the software. The chart has been reviewed, but errors may still be present. Patient Progress Patient progress: improved Procedures

## 2018-10-28 NOTE — ED NOTES
I have reviewed discharge instructions with the patient. The patient verbalized understanding. Patient left ED via Discharge Method: ambulatory to Home with spouse. Opportunity for questions and clarification provided. Patient given 3 scripts. To continue your aftercare when you leave the hospital, you may receive an automated call from our care team to check in on how you are doing. This is a free service and part of our promise to provide the best care and service to meet your aftercare needs.  If you have questions, or wish to unsubscribe from this service please call [phone]. Thank you for Choosing our [facility] Emergency Department.

## 2018-10-28 NOTE — DISCHARGE INSTRUCTIONS
Gastroesophageal Reflux Disease (GERD): Care Instructions  Your Care Instructions    Gastroesophageal reflux disease (GERD) is the backward flow of stomach acid into the esophagus. The esophagus is the tube that leads from your throat to your stomach. A one-way valve prevents the stomach acid from moving up into this tube. When you have GERD, this valve does not close tightly enough. If you have mild GERD symptoms including heartburn, you may be able to control the problem with antacids or over-the-counter medicine. Changing your diet, losing weight, and making other lifestyle changes can also help reduce symptoms. Follow-up care is a key part of your treatment and safety. Be sure to make and go to all appointments, and call your doctor if you are having problems. It's also a good idea to know your test results and keep a list of the medicines you take. How can you care for yourself at home? · Take your medicines exactly as prescribed. Call your doctor if you think you are having a problem with your medicine. · Your doctor may recommend over-the-counter medicine. For mild or occasional indigestion, antacids, such as Tums, Gaviscon, Mylanta, or Maalox, may help. Your doctor also may recommend over-the-counter acid reducers, such as Pepcid AC, Tagamet HB, Zantac 75, or Prilosec. Read and follow all instructions on the label. If you use these medicines often, talk with your doctor. · Change your eating habits. ? It's best to eat several small meals instead of two or three large meals. ? After you eat, wait 2 to 3 hours before you lie down. ? Chocolate, mint, and alcohol can make GERD worse. ? Spicy foods, foods that have a lot of acid (like tomatoes and oranges), and coffee can make GERD symptoms worse in some people. If your symptoms are worse after you eat a certain food, you may want to stop eating that food to see if your symptoms get better.   · Do not smoke or chew tobacco. Smoking can make GERD worse. If you need help quitting, talk to your doctor about stop-smoking programs and medicines. These can increase your chances of quitting for good. · If you have GERD symptoms at night, raise the head of your bed 6 to 8 inches by putting the frame on blocks or placing a foam wedge under the head of your mattress. (Adding extra pillows does not work.)  · Do not wear tight clothing around your middle. · Lose weight if you need to. Losing just 5 to 10 pounds can help. When should you call for help? Call your doctor now or seek immediate medical care if:    · You have new or different belly pain.     · Your stools are black and tarlike or have streaks of blood.    Watch closely for changes in your health, and be sure to contact your doctor if:    · Your symptoms have not improved after 2 days.     · Food seems to catch in your throat or chest.   Where can you learn more? Go to http://ashwini-artemio.info/. Enter D840 in the search box to learn more about \"Gastroesophageal Reflux Disease (GERD): Care Instructions. \"  Current as of: March 28, 2018  Content Version: 11.8  © 8240-6790 [employer]*. Care instructions adapted under license by [employer] (which disclaims liability or warranty for this information). If you have questions about a medical condition or this instruction, always ask your healthcare professional. [address] any warranty or liability for your use of this information. Pleurisy: Care Instructions  Your Care Instructions  Pleurisy is inflammation of the tissue that lines the inside of the chest and covers the lungs (pleura). Pleurisy is often caused by an infection, usually a virus. It also can be caused by other health problems, such as pneumonia or lupus. Pleurisy can cause sharp chest pain that gets worse when you cough or take a deep breath. You may need more tests to find out what is causing your pleurisy.  Treatment depends on the cause. Pleurisy may come and go for a few days, or it may continue if the cause has not been treated. Home treatment can help ease symptoms. Follow-up care is a key part of your treatment and safety. Be sure to make and go to all appointments, and call your doctor if you are having problems. It's also a good idea to know your test results and keep a list of the medicines you take. How can you care for yourself at home? · Take an over-the-counter pain medicine, such as acetaminophen (Tylenol), ibuprofen (Advil, Motrin), or naproxen (Aleve). Read and follow all instructions on the label. · Do not take two or more pain medicines at the same time unless the doctor told you to. Many pain medicines have acetaminophen, which is Tylenol. Too much acetaminophen (Tylenol) can be harmful. · If your doctor prescribed antibiotics, take them as directed. Do not stop taking them just because you feel better. You need to take the full course of antibiotics. · Take cough medicine as directed if your doctor recommends it. · Avoid activities that make the pain worse. When should you call for help? Call 911 anytime you think you may need emergency care. For example, call if:    · You have severe trouble breathing.     · You have severe chest pain.     · You passed out (lost consciousness).    Call your doctor now or seek immediate medical care if:    · You have a new or higher fever.    Watch closely for changes in your health, and be sure to contact your doctor if:    · You begin to cough up yellow or green mucus.     · You cough up blood.     · Your symptoms are not better in 3 or 4 days. Where can you learn more? Go to http://ashwini-artemio.info/. Enter F346 in the search box to learn more about \"Pleurisy: Care Instructions. \"  Current as of: December 6, 2017  Content Version: 11.8  © 6820-3574 [employer].  Care instructions adapted under license by [employer] (which disclaims liability or warranty for this information). If you have questions about a medical condition or this instruction, always ask your healthcare professional. [address] any warranty or liability for your use of this information.

## 2018-10-28 NOTE — ED TRIAGE NOTES
Patient complaining of constant chest pressure and crushing sensation x 2.5 days with pain during deep breath. Patient denies any episodes of diaphoresis or nausea and vomiting. Patient skin warm and dry at this time. Patient also complaining of increased indigestion over past two days.

## 2019-10-17 RX ORDER — SODIUM CHLORIDE 0.9 % (FLUSH) 0.9 %
5-40 SYRINGE (ML) INJECTION AS NEEDED
Status: CANCELLED | OUTPATIENT
Start: 2019-10-17

## 2019-10-17 RX ORDER — SODIUM CHLORIDE 0.9 % (FLUSH) 0.9 %
5-40 SYRINGE (ML) INJECTION EVERY 8 HOURS
Status: CANCELLED | OUTPATIENT
Start: 2019-10-17

## 2019-11-12 RX ORDER — SODIUM CHLORIDE 0.9 % (FLUSH) 0.9 %
5-40 SYRINGE (ML) INJECTION AS NEEDED
Status: CANCELLED | OUTPATIENT
Start: 2019-11-12

## 2019-11-12 RX ORDER — SODIUM CHLORIDE 0.9 % (FLUSH) 0.9 %
5-40 SYRINGE (ML) INJECTION EVERY 8 HOURS
Status: CANCELLED | OUTPATIENT
Start: 2019-11-12

## 2019-11-18 NOTE — H&P
Date of Service: 2019-10-17  Work Status:  ????? Allergies:EPINEPHrine; Tetanus Immune Globulin  Medications:amLODIPine Besylate (5 MG); Breo Ellipta (100-25 . .. MCG/INH); CoQ-10;Cymbalta; Fluticasone Propionate (50 MCG/ACT); Levothyroxine Sodium (75 MCG); Losartan Potassium (50 MG); Oxybutynin Chloride (5 MG); Tylenol;Vitamin B-12;Vitamin D-3;ZyrTEC Allergy    CC: Cyst of the left thumb    HPI:  The patient is a 76-year-old, right-hand-dominant woman who complains of previous pain around the base of both thumbs and locking and catching of the right thumb and a small cyst over the dorsum of the left thumb. Recently was seen here back on 08/15/2019 and I injected both TM joints and her right trigger thumb. Those have done well and are no longer giving her any problems currently. She is bothered, however, with the cyst over the dorsum of the left thumb which drains every 3 to 4 weeks and then seals up and gets larger and then drains again. It is a little red around the margins. She has a groove in the nail that she has noted distal to the cyst.       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 They are not tender or painful to palpation and manipulation today. She has good stability of the MP joints of both thumbs. The right thumb is not tender at the A1 pulley. It has good flexion and extension. The left thumb has a cystic-appearing lesion over the dorsal radial aspect at the base or corner of the nail. There is grooving in the nail distal to the mass. There is obvious burning and spurring and enlargement around the DIP joint. Circulation is good with brisk capillary refill in the fingertips bilaterally. Neurologic exam is intact to light touch in the radial, ulnar and median nerve distribution. Skin and nails are normal except for the cystic area as mentioned.      DISPOSITION:  The patient is doing well from the injections of her TM joints and right trigger thumb. I have told her that it is likely that at some point the TM joints will begin to hurt again and can be reinjected as needed. The trigger thumb may be permanently relieved or it may also begin catching once more. For now she is bothered by the recurrent opening and drainage of the mucous cyst of the left thumb and would like to have it surgically removed. We will schedule her for that in the near future at her convenience as an outpatient under [facility].

## 2019-11-19 ENCOUNTER — ANESTHESIA EVENT (OUTPATIENT)
Dept: SURGERY | Age: 79
End: 2019-11-19
Payer: MEDICARE

## 2019-11-20 ENCOUNTER — ANESTHESIA (OUTPATIENT)
Dept: SURGERY | Age: 79
End: 2019-11-20
Payer: MEDICARE

## 2019-11-20 ENCOUNTER — HOSPITAL ENCOUNTER (OUTPATIENT)
Age: 79
Setting detail: OUTPATIENT SURGERY
Discharge: HOME | End: 2019-11-20
Attending: ORTHOPAEDIC SURGERY | Admitting: ORTHOPAEDIC SURGERY
Payer: MEDICARE

## 2019-11-20 VITALS
TEMPERATURE: 97.8 F | SYSTOLIC BLOOD PRESSURE: 161 MMHG | WEIGHT: 150 LBS | BODY MASS INDEX: 24.11 KG/M2 | HEART RATE: 83 BPM | OXYGEN SATURATION: 97 % | HEIGHT: 66 IN | DIASTOLIC BLOOD PRESSURE: 77 MMHG | RESPIRATION RATE: 18 BRPM

## 2019-11-20 DIAGNOSIS — G89.18 POST-OP PAIN: Primary | ICD-10-CM

## 2019-11-20 PROCEDURE — 74011000250 HC RX REV CODE- 250: Performed by: ANESTHESIOLOGY

## 2019-11-20 PROCEDURE — 77030039266 HC ADH SKN EXOFIN S2SG -A: Performed by: ORTHOPAEDIC SURGERY

## 2019-11-20 PROCEDURE — 77030000032 HC CUF TRNQT ZIMM -B: Performed by: ORTHOPAEDIC SURGERY

## 2019-11-20 PROCEDURE — 76060000032 HC ANESTHESIA 0.5 TO 1 HR: Performed by: ORTHOPAEDIC SURGERY

## 2019-11-20 PROCEDURE — 76010000154 HC OR TIME FIRST 0.5 HR: Performed by: ORTHOPAEDIC SURGERY

## 2019-11-20 PROCEDURE — 74011000250 HC RX REV CODE- 250: Performed by: ORTHOPAEDIC SURGERY

## 2019-11-20 PROCEDURE — 74011250636 HC RX REV CODE- 250/636: Performed by: ANESTHESIOLOGY

## 2019-11-20 PROCEDURE — 77030031139 HC SUT VCRL2 J&J -A: Performed by: ORTHOPAEDIC SURGERY

## 2019-11-20 PROCEDURE — 74011250636 HC RX REV CODE- 250/636: Performed by: ORTHOPAEDIC SURGERY

## 2019-11-20 PROCEDURE — 74011250636 HC RX REV CODE- 250/636: Performed by: NURSE ANESTHETIST, CERTIFIED REGISTERED

## 2019-11-20 PROCEDURE — 77030018836 HC SOL IRR NACL ICUM -A: Performed by: ORTHOPAEDIC SURGERY

## 2019-11-20 PROCEDURE — 77030002916 HC SUT ETHLN J&J -A: Performed by: ORTHOPAEDIC SURGERY

## 2019-11-20 PROCEDURE — 76210000063 HC OR PH I REC FIRST 0.5 HR: Performed by: ORTHOPAEDIC SURGERY

## 2019-11-20 PROCEDURE — 76210000021 HC REC RM PH II 0.5 TO 1 HR: Performed by: ORTHOPAEDIC SURGERY

## 2019-11-20 RX ORDER — HYDROMORPHONE HYDROCHLORIDE 2 MG/ML
0.5 INJECTION, SOLUTION INTRAMUSCULAR; INTRAVENOUS; SUBCUTANEOUS
Status: DISCONTINUED | OUTPATIENT
Start: 2019-11-20 | End: 2019-11-20 | Stop reason: HOSPADM

## 2019-11-20 RX ORDER — FLUMAZENIL 0.1 MG/ML
0.2 INJECTION INTRAVENOUS AS NEEDED
Status: DISCONTINUED | OUTPATIENT
Start: 2019-11-20 | End: 2019-11-20 | Stop reason: HOSPADM

## 2019-11-20 RX ORDER — HYDROCODONE BITARTRATE AND ACETAMINOPHEN 5; 325 MG/1; MG/1
1 TABLET ORAL
Qty: 15 TAB | Refills: 0 | Status: SHIPPED | OUTPATIENT
Start: 2019-11-20 | End: 2019-11-23

## 2019-11-20 RX ORDER — NALOXONE HYDROCHLORIDE 0.4 MG/ML
0.1 INJECTION, SOLUTION INTRAMUSCULAR; INTRAVENOUS; SUBCUTANEOUS
Status: DISCONTINUED | OUTPATIENT
Start: 2019-11-20 | End: 2019-11-20 | Stop reason: HOSPADM

## 2019-11-20 RX ORDER — SODIUM CHLORIDE, SODIUM LACTATE, POTASSIUM CHLORIDE, CALCIUM CHLORIDE 600; 310; 30; 20 MG/100ML; MG/100ML; MG/100ML; MG/100ML
75 INJECTION, SOLUTION INTRAVENOUS CONTINUOUS
Status: DISCONTINUED | OUTPATIENT
Start: 2019-11-20 | End: 2019-11-20 | Stop reason: HOSPADM

## 2019-11-20 RX ORDER — DIPHENHYDRAMINE HYDROCHLORIDE 50 MG/ML
12.5 INJECTION, SOLUTION INTRAMUSCULAR; INTRAVENOUS
Status: DISCONTINUED | OUTPATIENT
Start: 2019-11-20 | End: 2019-11-20 | Stop reason: HOSPADM

## 2019-11-20 RX ORDER — OXYCODONE HYDROCHLORIDE 5 MG/1
5 TABLET ORAL
Status: DISCONTINUED | OUTPATIENT
Start: 2019-11-20 | End: 2019-11-20 | Stop reason: HOSPADM

## 2019-11-20 RX ORDER — SODIUM CHLORIDE 0.9 % (FLUSH) 0.9 %
5-40 SYRINGE (ML) INJECTION AS NEEDED
Status: DISCONTINUED | OUTPATIENT
Start: 2019-11-20 | End: 2019-11-20 | Stop reason: HOSPADM

## 2019-11-20 RX ORDER — SODIUM CHLORIDE 0.9 % (FLUSH) 0.9 %
5-40 SYRINGE (ML) INJECTION EVERY 8 HOURS
Status: DISCONTINUED | OUTPATIENT
Start: 2019-11-20 | End: 2019-11-20 | Stop reason: HOSPADM

## 2019-11-20 RX ORDER — CEFAZOLIN SODIUM/WATER 2 G/20 ML
2 SYRINGE (ML) INTRAVENOUS ONCE
Status: COMPLETED | OUTPATIENT
Start: 2019-11-20 | End: 2019-11-20

## 2019-11-20 RX ORDER — LIDOCAINE HYDROCHLORIDE 10 MG/ML
0.1 INJECTION INFILTRATION; PERINEURAL AS NEEDED
Status: DISCONTINUED | OUTPATIENT
Start: 2019-11-20 | End: 2019-11-20 | Stop reason: HOSPADM

## 2019-11-20 RX ORDER — PROPOFOL 10 MG/ML
INJECTION, EMULSION INTRAVENOUS
Status: DISCONTINUED | OUTPATIENT
Start: 2019-11-20 | End: 2019-11-20 | Stop reason: HOSPADM

## 2019-11-20 RX ORDER — LIDOCAINE HYDROCHLORIDE AND EPINEPHRINE 10; 10 MG/ML; UG/ML
INJECTION, SOLUTION INFILTRATION; PERINEURAL AS NEEDED
Status: DISCONTINUED | OUTPATIENT
Start: 2019-11-20 | End: 2019-11-20 | Stop reason: HOSPADM

## 2019-11-20 RX ORDER — PROPOFOL 10 MG/ML
INJECTION, EMULSION INTRAVENOUS AS NEEDED
Status: DISCONTINUED | OUTPATIENT
Start: 2019-11-20 | End: 2019-11-20 | Stop reason: HOSPADM

## 2019-11-20 RX ORDER — OXYCODONE HYDROCHLORIDE 5 MG/1
10 TABLET ORAL
Status: DISCONTINUED | OUTPATIENT
Start: 2019-11-20 | End: 2019-11-20 | Stop reason: HOSPADM

## 2019-11-20 RX ADMIN — PROPOFOL 160 MCG/KG/MIN: 10 INJECTION, EMULSION INTRAVENOUS at 09:37

## 2019-11-20 RX ADMIN — PROPOFOL 35 MG: 10 INJECTION, EMULSION INTRAVENOUS at 09:46

## 2019-11-20 RX ADMIN — Medication 2 G: at 09:37

## 2019-11-20 RX ADMIN — LIDOCAINE HYDROCHLORIDE 30 ML: 10 INJECTION, SOLUTION INFILTRATION; PERINEURAL at 09:33

## 2019-11-20 RX ADMIN — PROPOFOL 30 MG: 10 INJECTION, EMULSION INTRAVENOUS at 09:37

## 2019-11-20 RX ADMIN — SODIUM CHLORIDE, SODIUM LACTATE, POTASSIUM CHLORIDE, AND CALCIUM CHLORIDE 75 ML/HR: 600; 310; 30; 20 INJECTION, SOLUTION INTRAVENOUS at 08:00

## 2019-11-20 NOTE — DISCHARGE INSTRUCTIONS
POST OP INSTRUCTIONS      1. Patient has appointment for 12/5/19 at 2:00 PM at the [place] [address] office. 2.  For problems call Dr Javon Huynh, [facility],  [phone]          Appointments only,  [phone]    3. Ice and elevate the surgical site. 4.  May remove dressings in 2 days and  cover the  incision with Band-aids. ·         Keep the stitches dry. DIET  · Clear liquids until no nausea or vomiting; then light diet for the first day. · Advance to regular diet on second day, unless your doctor orders otherwise. · If nausea and vomiting continues, call your doctor. PAIN  · Take pain medication as directed by your doctor. · Call your doctor if pain is NOT relieved by medication. CALL YOUR DOCTOR IF   · Excessive bleeding that does not stop after holding pressure over the area  · Temperature of 101 degrees F or above  · Excessive redness, swelling or bruising, and/ or green or yellow, smelly discharge from incision    AFTER ANESTHESIA   · For the first 24 hours: DO NOT Drive, Drink alcoholic beverages, or Make important decisions. · Be aware of dizziness following anesthesia and while taking pain medication. DISCHARGE SUMMARY from Nurse    PATIENT INSTRUCTIONS:    After general anesthesia or intravenous sedation, for 24 hours or while taking prescription Narcotics:  · Limit your activities  · Do not drive and operate hazardous machinery  · Do not make important personal or business decisions  · Do  not drink alcoholic beverages  · If you have not urinated within 8 hours after discharge, please contact your surgeon on call. *  Please give a list of your current medications to your Primary Care Provider. *  Please update this list whenever your medications are discontinued, doses are      changed, or new medications (including over-the-counter products) are added.     *  Please carry medication information at all times in case of emergency situations. These are general instructions for a healthy lifestyle:    No smoking/ No tobacco products/ Avoid exposure to second hand smoke    Surgeon General's Warning:  Quitting smoking now greatly reduces serious risk to your health. Obesity, smoking, and sedentary lifestyle greatly increases your risk for illness    A healthy diet, regular physical exercise & weight monitoring are important for maintaining a healthy lifestyle    You may be retaining fluid if you have a history of heart failure or if you experience any of the following symptoms:  Weight gain of 3 pounds or more overnight or 5 pounds in a week, increased swelling in our hands or feet or shortness of breath while lying flat in bed. Please call your doctor as soon as you notice any of these symptoms; do not wait until your next office visit. Recognize signs and symptoms of STROKE:    F-face looks uneven    A-arms unable to move or move unevenly    S-speech slurred or non-existent    T-time-call 911 as soon as signs and symptoms begin-DO NOT go       Back to bed or wait to see if you get better-TIME IS BRAIN.

## 2019-11-20 NOTE — ANESTHESIA PREPROCEDURE EVALUATION
Relevant Problems   No relevant active problems       Anesthetic History   No history of anesthetic complications            Review of Systems / Medical History  Patient summary reviewed and pertinent labs reviewed    Pulmonary            Asthma : well controlled       Neuro/Psych             Comments: Fibromyalgia Cardiovascular    Hypertension: well controlled              Exercise tolerance: >4 METS     GI/Hepatic/Renal     GERD: well controlled    Renal disease: CRI       Endo/Other      Hypothyroidism: well controlled  Arthritis     Other Findings   Comments: Sjogren's syndrome           Physical Exam    Airway  Mallampati: II  TM Distance: 4 - 6 cm  Neck ROM: normal range of motion   Mouth opening: Normal     Cardiovascular    Rhythm: regular  Rate: normal         Dental  No notable dental hx       Pulmonary  Breath sounds clear to auscultation               Abdominal         Other Findings            Anesthetic Plan    ASA: 2  Anesthesia type: regional - [place] block            Anesthetic plan and risks discussed with: Patient and Spouse

## 2019-11-20 NOTE — ANESTHESIA PROCEDURE NOTES
Peripheral Block    Start time: 11/20/2019 9:31 AM  End time: 11/20/2019 9:36 AM  Performed by: Luis F Nj CRNA  Authorized by:  Luis F Nj CRNA       Pre-procedure:   Preanesthetic Checklist: patient identified, risks and benefits discussed, site marked, timeout performed, anesthesia consent given and patient being monitored    Timeout Time: 09:31          Block Type:   Block Type:  Orr block  Laterality:  Left  Patient Position:  Flat  Block Limb IV Checked: Yes    Esmarch exsanguination: Yes    Tourniquet Type:  Single  Tourniquet Location:  Below elbow  Tourniquet Inflated:  11/20/2019 9:34 AM  Inflation (mmHg):  250  Limb w/o Radial Pulse: Yes    Infused Agent:  Lidocaine 0.5%  Volume Infused (mL):  30  Tourniquet Deflated:  11/20/2019 9:55 AM    Assessment:    Injection Assessment:   Patient tolerance:  Patient tolerated the procedure well with no immediate complications

## 2019-11-20 NOTE — INTERVAL H&P NOTE
H&P Update: 
Ziyad Pan was seen and examined. Pt is alert and oriented. Chest: Clear w/o SOB. C/V:  RR&R History and physical has been reviewed. The patient has been examined. There have been no significant clinical changes since the completion of the originally dated History and Physical. 
Patient identified by surgeon; surgical site was confirmed by patient and surgeon. Pt is here for excision of a mucus cyst of her left thumb.

## 2019-11-20 NOTE — OP NOTES
OPERATIVE NOTE                    EXCISION OF MUCUS CYST    Swapna Zepeda         11/20/2019    PREOP DIAGNOSIS:  MUCUS CYST OF THE IP JOINT OF THE LEFT THUMB    POSTOP DIAGNOSIS: SAME    PROCEDURE:  EXCISION OF MUCUS CYST OF THE IP JOINT OF THE LEFT THUMB            SURGEON:  Kolton Kemp MD    ANESTHESIA:  [place]    INDICATIONS:  Swapna Zepeda is a 67 y.o. female with a mucus cyst of the left thumb . The planned procedure and alternatives for treatment have been discussed previously. Informed consent has been obtained. The operative site has been marked and perioperative IV Ancef given. PROCEDURAL NOTE:   The patient was brought to the OR and anesthetized for the procedure. The patient was positioned in the supine position and bony prominences and nerves padded or protected. The left upper extremity was prepped and draped as a sterile field. A time out was held with the operative team and the patient, procedure, surgical site and surgeon identified. A T-shaped incision was made over the dorso-radial of the thumb with the transverse limb over the extension crease. The skin flaps were elevated off the underlying bone and extensor tendon. There was a large osteophytic ridge on the base of the distal phalanx with a cyst sitting in a depression or cavity at the distal edge of the prominent bone. The thin walled cyst was excised and then the joint opened along the radial side the extensor tendon insertion. A small rongeur was used to remove the bony osteophyte that was present over the dorsum of the base of the distal phalanx. The skin flaps were reapproximated with 4-0 nylon stitches. A digital block was done at the metacarpal neck level using 1 % lidocaine with epi. A sterile dressing of Xeroform, gauze and akanksha wrapping was applied. The tourniquet was released with good return of color to the fingers. The patient was sent to the recovery room in good condition.     TOURNIQUET TIME: Total Tourniquet Time Documented:  Forearm (Left) - 22 minutes  Total: Forearm (Left) - 22 minutes      SIGNED: Alice Frazier MD

## 2019-11-20 NOTE — ANESTHESIA POSTPROCEDURE EVALUATION
Procedure(s):  EXCISION OF MUCUS  CYST LEFT THUMB.    regional    Anesthesia Post Evaluation      Multimodal analgesia: multimodal analgesia used between 6 hours prior to anesthesia start to PACU discharge  Patient location during evaluation: PACU  Patient participation: complete - patient participated  Level of consciousness: awake  Pain management: adequate  Airway patency: patent  Anesthetic complications: no  Cardiovascular status: acceptable and hemodynamically stable  Respiratory status: acceptable  Hydration status: acceptable  Comments: Acceptable for discharge from PACU.   Post anesthesia nausea and vomiting:  none      Vitals Value Taken Time   /63 11/20/2019 10:20 AM   Temp 36.4 °C (97.6 °F) 11/20/2019 10:05 AM   Pulse 81 11/20/2019 10:20 AM   Resp 22 11/20/2019 10:20 AM   SpO2 96 % 11/20/2019 10:20 AM

## 2020-05-28 ENCOUNTER — HOSPITAL ENCOUNTER (OUTPATIENT)
Dept: MAMMOGRAPHY | Age: 80
Discharge: HOME | End: 2020-05-28
Attending: INTERNAL MEDICINE
Payer: MEDICARE

## 2020-05-28 DIAGNOSIS — Z12.31 ENCOUNTER FOR SCREENING MAMMOGRAM FOR BREAST CANCER: ICD-10-CM

## 2020-05-28 PROCEDURE — 77063 BREAST TOMOSYNTHESIS BI: CPT

## 2022-04-14 ENCOUNTER — TRANSCRIBE ORDER (OUTPATIENT)
Dept: SCHEDULING | Age: 82
End: 2022-04-14

## 2022-04-14 DIAGNOSIS — Z12.31 ENCOUNTER FOR SCREENING MAMMOGRAM FOR BREAST CANCER: Primary | ICD-10-CM

## 2022-04-14 DIAGNOSIS — Z78.0 ASYMPTOMATIC MENOPAUSE: Primary | ICD-10-CM

## 2022-04-28 ENCOUNTER — HOSPITAL ENCOUNTER (OUTPATIENT)
Dept: MAMMOGRAPHY | Age: 82
Discharge: HOME | End: 2022-04-28
Attending: INTERNAL MEDICINE
Payer: MEDICARE

## 2022-04-28 DIAGNOSIS — Z78.0 ASYMPTOMATIC MENOPAUSE: ICD-10-CM

## 2022-04-28 PROCEDURE — 77080 DXA BONE DENSITY AXIAL: CPT

## 2023-08-08 ENCOUNTER — OFFICE VISIT (OUTPATIENT)
Dept: ORTHOPEDIC SURGERY | Age: 83
End: 2023-08-08
Payer: MEDICARE

## 2023-08-08 DIAGNOSIS — M25.572 ACUTE LEFT ANKLE PAIN: Primary | ICD-10-CM

## 2023-08-08 PROCEDURE — 99213 OFFICE O/P EST LOW 20 MIN: CPT | Performed by: ORTHOPAEDIC SURGERY

## 2023-08-08 PROCEDURE — 1123F ACP DISCUSS/DSCN MKR DOCD: CPT | Performed by: ORTHOPAEDIC SURGERY

## 2023-08-08 RX ORDER — ATORVASTATIN CALCIUM 20 MG/1
TABLET, FILM COATED ORAL
COMMUNITY
Start: 2023-07-31

## 2023-08-08 RX ORDER — LOSARTAN POTASSIUM 50 MG/1
50 TABLET ORAL DAILY
COMMUNITY
Start: 2023-06-09

## 2023-08-08 RX ORDER — CIPROFLOXACIN 250 MG/1
250 TABLET, FILM COATED ORAL EVERY 12 HOURS
COMMUNITY
Start: 2023-05-19

## 2023-08-08 RX ORDER — MIRABEGRON 25 MG/1
TABLET, FILM COATED, EXTENDED RELEASE ORAL
COMMUNITY
Start: 2023-06-16

## 2023-08-08 RX ORDER — MONTELUKAST SODIUM 10 MG/1
TABLET ORAL
COMMUNITY
Start: 2023-06-16

## 2023-08-08 RX ORDER — TRIAMCINOLONE ACETONIDE 5 MG/G
CREAM TOPICAL
COMMUNITY
Start: 2023-07-12

## 2023-08-08 RX ORDER — CEPHALEXIN 500 MG/1
CAPSULE ORAL
COMMUNITY
Start: 2023-05-24

## 2023-08-08 NOTE — PROGRESS NOTES
Name: Anna Flores  YOB: 1940  Gender: female  MRN: [id number]    Summary: Bilateral foot cramping. CMP and BM P ordered. She has a palpable pulse. Bilateral bunions and hammertoes-minimally symptomatic. Most of the symptoms are in the right dorsal bone prominence. Potential cheilectomy simply for prominence irritation. Anterior tibial tendinitis-night splint physical therapy ordered      Follow-up in 6 to 8 weeks to evaluate anterior tibial tendinitis versus midfoot arthritis     CC: bilateral foot cramping bunions and hammertoes    HPI: Anna Flores is a 81 y.o. female who main complaint is bilateral foot cramping for multiple years. She states it is worse at night. She denies any acute events. She also wants to her bunions and her hammertoes as they are mildly symptomatic but she is noticed that the right great toe has a large prominence that is becoming more symptomatic. She states that it irritates her shoewear and she is now forced to wear very large shoes or strictly open toed shoes primarily due to the dorsum of her right great toe    She presents with her  today who discusses and confirms her concerns    ROS/Meds/PSH/PMH/FH/SH:   Patient Denies fever/chills, headache, visual changes, chest pain, shortness of breath, and nausea/vomiting/diarrhea     Tobacco:  reports that she quit smoking about 14 years ago. She smoked an average of 1 pack per day. She has never used smokeless tobacco.  Diabetes: None  No results found for: LABA1C  Other: none    Physical Examination:  Gen: AAOx3 NAD  Resp: CTAB - no wheezing  Card: RRR  Eyes: sclera non icteric    right lower: TTP . +silt s/s/sp/dp/t. 5/5 strength to EHL/FHL/AT/PT/Wilber/Achilles. toes wwp w/ BCR <2s. No open wounds. No pain with calf squeeze. TTP @ great toe with a large dorsal medial osteophyte noted.   Redness and erythema surrounding it obvious of inflammation  Achilles Contracture noted on silverskoid exam:

## 2023-08-08 NOTE — PROGRESS NOTES
Patient was prescribed a nightsplint and against medical advice the patient declined to receive/purchase the DME. Patient understands they may take their prescription elsewhere if desired. She was going to order one on [employer]. She was not sure how much her insurance would cover on the brace.

## 2023-08-29 ENCOUNTER — HOSPITAL ENCOUNTER (OUTPATIENT)
Dept: PHYSICAL THERAPY | Age: 83
Setting detail: RECURRING SERIES
Discharge: HOME | End: 2023-09-01
Attending: ORTHOPAEDIC SURGERY
Payer: MEDICARE

## 2023-08-29 PROCEDURE — 97161 PT EVAL LOW COMPLEX 20 MIN: CPT

## 2023-08-29 NOTE — THERAPY EVALUATION
Madhu Dang  : 1940  Primary: [address] Medicare Advantage (Medicare Managed)  Secondary:  [address] [facility]  [address]  [address] ,  [address] [place] [address]  Phone: [phone]  Fax: [phone] Plan Frequency: 1x/wk    Plan of Care/Certification Expiration Date: 23      PT Visit Info:  Plan Frequency: 1x/wk  Plan of Care/Certification Expiration Date: 23      Visit Count:  1                OUTPATIENT PHYSICAL THERAPY:             OP NOTE TYPE: Initial Assessment 2023               Episode (B foot pain) Appt Desk             ICD-10: Treatment Diagnosis: Pain in left ankle and joints of left foot (M25.572)  Pain in right ankle and joints of right foot (M25.571)  Stiffness of left ankle, not elsewhere classified (M25.672)  Stiffness of right ankle, not elsewhere classified (M25.671)    Medical/Referring Diagnosis:  Acute left ankle pain [M25.572]  Referring Physician:  Antonette Parar MD MD Orders:  PT Eval and Treat   Return MD Appt:    Date of Onset:  Onset Date: 22      Allergies:  Articaine-epinephrine, Bee venom, Prednisone, Sulfamethoxazole, Tetanus toxoids, and Trimethoprim  Restrictions/Precautions:  none          Medications Last Reviewed:  2023     SUBJECTIVE   History of Injury/Illness (Reason for Referral):  Pt reports cramps and aching in the inner side of both feet. Daily activities are not hindered, but she notices the pain coming and going randomly. 1 year duration. Pt recently bought some new [employer] shoes but says she did not realize they were a wide size. She says her foot is moving around in there. Pain and cramps are worse on L foot. Hx of L plantar fascitis.   Patient Stated Goal(s):  \"decrease cramping and ache\"  Initial:     1 /10 Post Session:  1    /10  Past Medical History/Comorbidities:   Ms. Schuyler Soliz  has a past medical history of Adverse effect of anesthesia, Arthritis, Asthma, Chronic kidney disease, Fibromyalgia,

## 2023-09-19 ENCOUNTER — HOSPITAL ENCOUNTER (OUTPATIENT)
Dept: PHYSICAL THERAPY | Age: 83
Setting detail: RECURRING SERIES
Discharge: HOME | End: 2023-09-22
Attending: ORTHOPAEDIC SURGERY
Payer: MEDICARE

## 2023-09-19 PROCEDURE — 97110 THERAPEUTIC EXERCISES: CPT

## 2023-09-19 PROCEDURE — 97140 MANUAL THERAPY 1/> REGIONS: CPT

## 2023-09-19 NOTE — PROGRESS NOTES
Dylon Garza  : 1940  Primary: [address] Medicare Advantage (Medicare Managed)  Secondary:  [address] [facility]  [address]  [address] ,  [address] [place] [address]  Phone: [phone]  Fax: [phone] Plan Frequency: 1x/wk    Plan of Care/Certification Expiration Date: 23      >PT Visit Info:  Plan Frequency: 1x/wk  Plan of Care/Certification Expiration Date: 23      Visit Count:  2    OUTPATIENT PHYSICAL THERAPY:OP NOTE TYPE: OP Note Type: Treatment Note 2023       Episode  }Appt Desk           ICD-10: Treatment Diagnosis: Pain in left ankle and joints of left foot (M25.572)  Pain in right ankle and joints of right foot (M25.571)  Stiffness of left ankle, not elsewhere classified (M25.672)  Stiffness of right ankle, not elsewhere classified (M25.671)  Medical/Referring Diagnosis:  Acute left ankle pain [M25.572]  Referring Physician:  Asmita Dennis MD MD Orders:  PT Eval and Treat   Date of Onset:  Onset Date: 22     Allergies:   Articaine-epinephrine, Bee venom, Prednisone, Sulfamethoxazole, Tetanus toxoids, and Trimethoprim  Restrictions/Precautions:  No data recordedNo data recorded     Interventions Planned (Treatment may consist of any combination of the following):    Current Treatment Recommendations: Strengthening; ROM; Balance training; Dry needling; Modalities; Home exercise program; Pain management; Manual; Neuromuscular re-education; Gait training     >Subjective Comments: Pt reports that the inner side/top of both of her feet have been bothering her a lot. She got new shoes as instructed. >Initial:    3  /10>Post Session:   0     10  Medications Last Reviewed:  2023  Updated Objective Findings:  None Today  Treatment   THERAPEUTIC EXERCISE: (25 minutes):    Exercises per grid below to improve mobility, strength, and balance.   Required moderate visual, verbal, manual, and tactile cues to promote proper body alignment, promote proper body

## 2023-09-26 ENCOUNTER — HOSPITAL ENCOUNTER (OUTPATIENT)
Dept: PHYSICAL THERAPY | Age: 83
Setting detail: RECURRING SERIES
Discharge: HOME | End: 2023-09-29
Attending: ORTHOPAEDIC SURGERY
Payer: MEDICARE

## 2023-09-26 PROCEDURE — 97140 MANUAL THERAPY 1/> REGIONS: CPT

## 2023-09-26 PROCEDURE — 97110 THERAPEUTIC EXERCISES: CPT

## 2023-09-26 NOTE — PROGRESS NOTES
Ila Holm  : 1940  Primary: [address] Medicare Advantage (Medicare Managed)  Secondary:  [address] [facility]  [address]  [address] ,  [address] [place] [address]  Phone: [phone]  Fax: [phone] Plan Frequency: 1x/wk    Plan of Care/Certification Expiration Date: 23      >PT Visit Info:  Plan Frequency: 1x/wk  Plan of Care/Certification Expiration Date: 23      Visit Count:  3    OUTPATIENT PHYSICAL THERAPY:OP NOTE TYPE: OP Note Type: Treatment Note 2023       Episode  }Appt Desk           ICD-10: Treatment Diagnosis: Pain in left ankle and joints of left foot (M25.572)  Pain in right ankle and joints of right foot (M25.571)  Stiffness of left ankle, not elsewhere classified (M25.672)  Stiffness of right ankle, not elsewhere classified (M25.671)  Medical/Referring Diagnosis:  Acute left ankle pain [M25.572]  Referring Physician:  Elaine Palmer MD MD Orders:  PT Eval and Treat   Date of Onset:  Onset Date: 22     Allergies:   Articaine-epinephrine, Bee venom, Prednisone, Sulfamethoxazole, Tetanus toxoids, and Trimethoprim  Restrictions/Precautions:  No data recordedNo data recorded     Interventions Planned (Treatment may consist of any combination of the following):    Current Treatment Recommendations: Strengthening; ROM; Balance training; Dry needling; Modalities; Home exercise program; Pain management; Manual; Neuromuscular re-education; Gait training     >Subjective Comments: Pt reports that she did not get any sleep last night as she tried using a C-pap for the first time. Her feet continue to bother her.     >Initial:    2 /10>Post Session:   1     /10  Medications Last Reviewed:  2023  Updated Objective Findings:  None Today  Treatment   THERAPEUTIC EXERCISE: (29 minutes):    Exercises per grid below to improve mobility, strength, and balance.   Required moderate visual, verbal, manual, and tactile cues to promote proper body alignment, promote

## 2023-10-02 ENCOUNTER — HOSPITAL ENCOUNTER (OUTPATIENT)
Dept: PHYSICAL THERAPY | Age: 83
Setting detail: RECURRING SERIES
Discharge: HOME | End: 2023-10-05
Attending: ORTHOPAEDIC SURGERY
Payer: MEDICARE

## 2023-10-02 PROCEDURE — 97140 MANUAL THERAPY 1/> REGIONS: CPT

## 2023-10-02 PROCEDURE — 97110 THERAPEUTIC EXERCISES: CPT

## 2023-10-04 ENCOUNTER — HOSPITAL ENCOUNTER (OUTPATIENT)
Dept: PHYSICAL THERAPY | Age: 83
Setting detail: RECURRING SERIES
End: 2023-10-04
Attending: ORTHOPAEDIC SURGERY
Payer: MEDICARE

## 2023-10-04 NOTE — PROGRESS NOTES
Deniz Chavez   (IOP:5/[id number]) [address] at  [address] [place] [address]  [phone]  [phone]             DATE: 10/4/2023    Patient canceled appointment today due to unknown reasons with <24hr notice. Will plan to follow up on next scheduled visit.     Lis Adams PT, DPT

## 2023-10-10 ENCOUNTER — TELEPHONE (OUTPATIENT)
Dept: ORTHOPEDIC SURGERY | Age: 83
End: 2023-10-10

## 2023-10-10 NOTE — TELEPHONE ENCOUNTER
Please call patient, she has swollen ankle was off of it and wont go down, can we work her in tomorrow?

## 2023-10-11 ENCOUNTER — TELEPHONE (OUTPATIENT)
Dept: ORTHOPEDIC SURGERY | Age: 83
End: 2023-10-11

## 2023-10-12 ENCOUNTER — OFFICE VISIT (OUTPATIENT)
Dept: ORTHOPEDIC SURGERY | Age: 83
End: 2023-10-12

## 2023-10-12 DIAGNOSIS — M25.572 ACUTE LEFT ANKLE PAIN: Primary | ICD-10-CM

## 2023-10-12 DIAGNOSIS — M19.079 ARTHRITIS OF MIDFOOT: ICD-10-CM

## 2023-10-12 RX ORDER — CHOLECALCIFEROL (VITAMIN D3) 125 MCG
1 CAPSULE ORAL
COMMUNITY

## 2023-10-12 RX ORDER — FLUTICASONE FUROATE AND VILANTEROL 100; 25 UG/1; UG/1
1 POWDER RESPIRATORY (INHALATION)
COMMUNITY

## 2023-10-12 RX ORDER — METHYLPREDNISOLONE ACETATE 40 MG/ML
40 INJECTION, SUSPENSION INTRA-ARTICULAR; INTRALESIONAL; INTRAMUSCULAR; SOFT TISSUE ONCE
Status: SHIPPED | OUTPATIENT
Start: 2023-10-12

## 2023-10-12 NOTE — PROGRESS NOTES
Name: Rosaline Ortiz  YOB: 1940  Gender: female  MRN: [id number]    Summary: Bilateral foot cramping midfoot arthritis-hallux rigidus. I think main issue is midfoot arthritis resulting in muscle spasms throughout the midfoot and cramping. Plan is to continue physical therapy, midfoot injection provided today. After long discussion with her she states she is not interested in any type of surgical management at all. I will have her see Dr. Carla Aburto to discuss continued nonoperative management. CC: bilateral foot cramping bunions and hammertoes    HPI: Rosaline Ortiz is a 81 y.o. female who main complaint is bilateral foot cramping for multiple years. She states it is worse at night. She denies any acute events. She also wants to her bunions and her hammertoes as they are mildly symptomatic but she is noticed that the right great toe has a large prominence that is becoming more symptomatic. She states that it irritates her shoewear and she is now forced to wear very large shoes or strictly open toed shoes primarily due to the dorsum of her right great toe  She presents with her  today who discusses and confirms her concerns    10/12-still having pain issues. Stabbing cramping and swelling in the arch of her foot. CMP and BMP is normal.  She was then massage at the physical therapy. ROS/Meds/PSH/PMH/FH/SH:   Patient Denies fever/chills, headache, visual changes, chest pain, shortness of breath, and nausea/vomiting/diarrhea     Tobacco:  reports that she quit smoking about 15 years ago. She smoked an average of 1 pack per day. She has never used smokeless tobacco.  Diabetes: None  No results found for: \"LABA1C\"  Other: none    Physical Examination:  Gen: AAOx3 NAD  Resp: CTAB - no wheezing  Card: RRR  Eyes: sclera non icteric    bilateral lower: TTP . +silt s/s/sp/dp/t. 5/5 strength to EHL/FHL/AT/PT/Wilber/Achilles. toes wwp w/ BCR <2s. No open wounds.   No pain with calf

## 2023-11-06 ENCOUNTER — TELEPHONE (OUTPATIENT)
Dept: ORTHOPEDIC SURGERY | Age: 83
End: 2023-11-06

## 2023-11-06 NOTE — TELEPHONE ENCOUNTER
Dr Adeline Pennington has been seeing this pt and he is  Moving her to [facility], the first appt with [facility]  Is Dec 11.   She wants to know if Dr Adeline Pennington  Will give her one more inj in her foot,l because she has to wait so long to see [facility]

## 2023-12-11 ENCOUNTER — OFFICE VISIT (OUTPATIENT)
Dept: ORTHOPEDIC SURGERY | Age: 83
End: 2023-12-11

## 2023-12-11 DIAGNOSIS — M76.811 ANTERIOR TIBIAL TENDONITIS, RIGHT: ICD-10-CM

## 2023-12-11 DIAGNOSIS — M76.812 ANTERIOR TIBIAL TENDONITIS, LEFT: ICD-10-CM

## 2023-12-11 DIAGNOSIS — M79.2 FOOT NEURALGIA: ICD-10-CM

## 2023-12-11 DIAGNOSIS — M19.079 ARTHRITIS OF MIDFOOT: Primary | ICD-10-CM

## 2023-12-11 RX ORDER — GABAPENTIN 100 MG/1
100 CAPSULE ORAL 3 TIMES DAILY
Qty: 90 CAPSULE | Refills: 2 | Status: SHIPPED | OUTPATIENT
Start: 2023-12-11 | End: 2024-01-10

## 2023-12-11 NOTE — PROGRESS NOTES
The patient was prescribed a Wraptor brace for the patient's bilateralfoot. The patient wears a size NA shoe and I fitted the patient with a M brace. I explained how to fit the brace properly by pulling the lace tabs across top of foot first then under arch and lastly pulling the strap up firmly and attaching to the lateral Velcro strip. Thus forming a figure 8 across the ankle joint. Once the figure 8 is completed they are to secure the top (short circumferential) straps to help avoid the straps from loosening with normal wear. The patient was able to demonstrate proper fitting in office to ensure compliance with device and acknowledged satisfaction with current fit. The patient was also prescribed and fitted with bilateral boot sleeves, size small. Patient read and signed documenting they understand and agree to [facility]'s current DME return policy.

## 2023-12-11 NOTE — PROGRESS NOTES
Name: Margarita Kamara  YOB: 1940  Gender: female  MRN: [id number]    CC: Foot pain    HPI:   12/11/2023: Second opinion consultation from Dr. Ely Sol: > 1.5 years of bilateral foot pain: Constant pain but worse at night    ROS/Meds/PSH/PMH/FH/SH: reviewed today    Tobacco:  reports that she quit smoking about 15 years ago. Her smoking use included cigarettes. She smoked an average of 1 pack per day. She has never used smokeless tobacco.   Epic listed diagnoses: Chronic kidney disease: Fibromyalgia: Sjogren syndrome:    Physical Examination:  Patient appears to be alert and oriented with acceptable appearance. No obvious distress or SOB  CV: appears to have acceptable vascular color and capillary refill  Neuro: appears to have mostly intact light touch sensation   Skin: Bilateral midfoot thickening  MS: Standing: Plantigrade: Bunions: Claw toes: Gait age-dependent  Bilateral = dorsal 1-3 TMT pain but the majority of her pain is insertional ATT  Bilateral = bunions more rigid on the right  Bilateral = no evidence for ATT PTT or peroneal deficiencies     XR: No new x-rays taken today: I reviewed standing AP lateral mortise ankle plus AP oblique feet taken 10/12/2023: Bilateral bunions: Right hallux rigidus with retained bunionectomy screw: Bilateral tarsometatarsal arthritis; bilateral plantar and posterior heel enthesopathy; bilateral mild hindfoot arthritis  XR Impression:  As above      Reviewed Test/Records/Documents:   10/12/2023: Dr. Jordan Bey: Reflects chronic bilateral foot cramping worse at night.   Also discussed bunions and hammertoes: Reflects that her CMP and BMP were normal.  She has a massage therapist.  He diagnosed bilateral foot cramping midfoot arthritis: Hallux rigidus: Recommended PT: Performed midfoot injection    Injection: No indication today as she received an injection from Dr. Ely Sol 10/12/2023    Assessment:    Bilateral arthritic bunions, claw toes  Bilateral tarsometatarsal

## 2024-04-03 ENCOUNTER — OFFICE VISIT (OUTPATIENT)
Dept: ORTHOPEDIC SURGERY | Age: 84
End: 2024-04-03
Payer: MEDICARE

## 2024-04-03 DIAGNOSIS — M19.072 ARTHRITIS OF BOTH MIDFEET: Primary | ICD-10-CM

## 2024-04-03 DIAGNOSIS — M19.071 ARTHRITIS OF BOTH MIDFEET: Primary | ICD-10-CM

## 2024-04-03 PROCEDURE — G8399 PT W/DXA RESULTS DOCUMENT: HCPCS | Performed by: ORTHOPAEDIC SURGERY

## 2024-04-03 PROCEDURE — G8421 BMI NOT CALCULATED: HCPCS | Performed by: ORTHOPAEDIC SURGERY

## 2024-04-03 PROCEDURE — G8427 DOCREV CUR MEDS BY ELIG CLIN: HCPCS | Performed by: ORTHOPAEDIC SURGERY

## 2024-04-03 PROCEDURE — 99213 OFFICE O/P EST LOW 20 MIN: CPT | Performed by: ORTHOPAEDIC SURGERY

## 2024-04-03 PROCEDURE — 1123F ACP DISCUSS/DSCN MKR DOCD: CPT | Performed by: ORTHOPAEDIC SURGERY

## 2024-04-03 PROCEDURE — 1036F TOBACCO NON-USER: CPT | Performed by: ORTHOPAEDIC SURGERY

## 2024-04-03 PROCEDURE — 1090F PRES/ABSN URINE INCON ASSESS: CPT | Performed by: ORTHOPAEDIC SURGERY

## 2024-04-03 RX ORDER — PANTOPRAZOLE SODIUM 40 MG/1
40 TABLET, DELAYED RELEASE ORAL
COMMUNITY
Start: 2024-03-03 | End: 2024-06-01

## 2024-04-03 RX ORDER — AZELASTINE HYDROCHLORIDE 137 UG/1
SPRAY, METERED NASAL
COMMUNITY
Start: 2024-02-23

## 2024-04-03 RX ORDER — NIRMATRELVIR AND RITONAVIR 150-100 MG
KIT ORAL
COMMUNITY
Start: 2024-02-23

## 2024-04-03 RX ORDER — OXYBUTYNIN CHLORIDE 5 MG/1
TABLET ORAL
COMMUNITY
Start: 2024-03-15

## 2024-04-03 RX ORDER — FLUTICASONE FUROATE, UMECLIDINIUM BROMIDE AND VILANTEROL TRIFENATATE 200; 62.5; 25 UG/1; UG/1; UG/1
POWDER RESPIRATORY (INHALATION)
COMMUNITY
Start: 2024-03-08

## 2024-04-03 NOTE — PROGRESS NOTES
Bilateral arthritic bunions, claw toes, tarsometatarsal arthritis  Bilateral insertional anterior tibial tendinitis - resolved   Bilateral nocturnal foot pain suspected neuropathic - controlled     Plan:   The patient and I discussed the above assessment. We explored treatment options.     Dr. Stacy reported foot cramping.    Patient reported that she has had LE cramping for years.   I asked her to get with her renal MD or PCP regarding acceptable medications and ways to handle her cramping.  She understood that was out of my realm of treatment  Thankfully; however, she reports that her compression sleeves resolved her cramping    Regarding her bilateral arthritic bunions and claw toes, those are not her concerns  Regarding her bilateral midfoot arthritis, she reports minimal improvement with injection, but no pain today    Regarding her nocturnal pain and cramping:  Both apparently have resolved with medication and her compression sleeves  She no longer wears ankle braces  She is unable to recall whether she is taking the natural medication from [facility] or prescribed Neurontin      Advanced medical imaging: Right ankle/hindfoot MRI scan: Left ankle/hindfoot MRI scan: Assess for insertional ATT tearing: No indication     We discussed care and Incredible wear calf sleeves   PT: [facility] sports club: She did not attend PT and does not feel she needs PT.  Orthotic/prosthetic: No indication today       Medication - OTC meds prn:   No Sterapred: Prednisone listed as allergy with tongue swelling  Recommend she go home and evaluate prior recommended/prescribed:  Boswellia, Devil's claw, Turmeric-curcumin   Magne sports topical rub with frankincense and myrrh   Natural anti-cramping medication   Neurontin 100 mg 2 po qhs, 1 po q am; # 90; 2 refills     Surgical discussion: Discussed future potential and multiple rounds:  Right: Left: 1st MTP fusions, claw toe resections  Right: Left: 1-3 TMT fusions: ATT reconstruction  Follow

## 2024-07-17 ENCOUNTER — OFFICE VISIT (OUTPATIENT)
Dept: ORTHOPEDIC SURGERY | Age: 84
End: 2024-07-17
Payer: MEDICARE

## 2024-07-17 DIAGNOSIS — Z96.641 HISTORY OF TOTAL RIGHT HIP REPLACEMENT: ICD-10-CM

## 2024-07-17 DIAGNOSIS — M54.50 CHRONIC BILATERAL LOW BACK PAIN, UNSPECIFIED WHETHER SCIATICA PRESENT: Primary | ICD-10-CM

## 2024-07-17 DIAGNOSIS — Z96.642 H/O TOTAL HIP ARTHROPLASTY, LEFT: ICD-10-CM

## 2024-07-17 DIAGNOSIS — Z09 SURGERY FOLLOW-UP: ICD-10-CM

## 2024-07-17 DIAGNOSIS — G89.29 CHRONIC BILATERAL LOW BACK PAIN, UNSPECIFIED WHETHER SCIATICA PRESENT: Primary | ICD-10-CM

## 2024-07-17 DIAGNOSIS — M25.551 HIP PAIN, RIGHT: ICD-10-CM

## 2024-07-17 PROCEDURE — G8428 CUR MEDS NOT DOCUMENT: HCPCS | Performed by: ORTHOPAEDIC SURGERY

## 2024-07-17 PROCEDURE — 99214 OFFICE O/P EST MOD 30 MIN: CPT | Performed by: ORTHOPAEDIC SURGERY

## 2024-07-17 PROCEDURE — 1036F TOBACCO NON-USER: CPT | Performed by: ORTHOPAEDIC SURGERY

## 2024-07-17 PROCEDURE — G8421 BMI NOT CALCULATED: HCPCS | Performed by: ORTHOPAEDIC SURGERY

## 2024-07-17 PROCEDURE — 1090F PRES/ABSN URINE INCON ASSESS: CPT | Performed by: ORTHOPAEDIC SURGERY

## 2024-07-17 PROCEDURE — 1123F ACP DISCUSS/DSCN MKR DOCD: CPT | Performed by: ORTHOPAEDIC SURGERY

## 2024-07-17 PROCEDURE — G8399 PT W/DXA RESULTS DOCUMENT: HCPCS | Performed by: ORTHOPAEDIC SURGERY

## 2024-07-17 NOTE — PROGRESS NOTES
Name: Majo Escobedo  YOB: 1940  Gender: female  MRN: [id number]    CC: Low back pain, gait alteration, concern with bilateral total hip arthroplasties    HPI: Majo Escobedo is a 84 y.o. female who presents primarily with low back pain and concerns with her gait.  She does have a history of right total hip arthroplasty done in [place] around 20 years ago and a left total hip arthroplasty done by Dr. Youngblood more recently.  She unfortunately sustained calcar fracture with both surgeries requiring cable fixation but ultimately had good results with both hips.  I did see her in 2021 with some back pain concerns and bursitis issues.  Her x-rays at that time were reassuring.    She comes in today complaining again primarily of lower back pain worse on the right and the left with some buttock and IT band symptoms.  She is concerned about her gait and her ability to get up and down.  She denies any groin pain or weightbearing discomfort anteriorly.    She has had no fevers malaise or general constitutional change.  She has not been using an assistive device and is somewhat resistant to doing so..    History was obtained from patient and spouse    ROS/Meds/PSH/PMH/FH/SH: I personally reviewed the patients standard intake form.  Below are the pertinents    Tobacco:  reports that she quit smoking about 15 years ago. Her smoking use included cigarettes. She has never used smokeless tobacco.  Past Medical History:   Diagnosis Date    Adverse effect of anesthesia     slow waking    Arthritis     Asthma     MDI prn, last usage 9/10/18    Chronic kidney disease     stage III, Followed by nephrologist- Dr. Tucker     Fibromyalgia     Fibromyalgia     GERD (gastroesophageal reflux disease)     controlled with med    History of tobacco abuse     1/2 ppd x 40 years, quit 2007    Hypertension     med    Rotator cuff tear arthropathy of both shoulders     Sjogrens syndrome (HCC)     Status post left hip replacement

## 2024-07-31 ENCOUNTER — OFFICE VISIT (OUTPATIENT)
Dept: ORTHOPEDIC SURGERY | Age: 84
End: 2024-07-31
Payer: MEDICARE

## 2024-07-31 VITALS — WEIGHT: 154 LBS | HEIGHT: 66 IN | BODY MASS INDEX: 24.75 KG/M2

## 2024-07-31 DIAGNOSIS — M47.816 LUMBAR SPONDYLOSIS: Primary | ICD-10-CM

## 2024-07-31 PROCEDURE — 99204 OFFICE O/P NEW MOD 45 MIN: CPT | Performed by: PHYSICIAN ASSISTANT

## 2024-07-31 PROCEDURE — 1036F TOBACCO NON-USER: CPT | Performed by: PHYSICIAN ASSISTANT

## 2024-07-31 PROCEDURE — G8399 PT W/DXA RESULTS DOCUMENT: HCPCS | Performed by: PHYSICIAN ASSISTANT

## 2024-07-31 PROCEDURE — 1090F PRES/ABSN URINE INCON ASSESS: CPT | Performed by: PHYSICIAN ASSISTANT

## 2024-07-31 PROCEDURE — G8427 DOCREV CUR MEDS BY ELIG CLIN: HCPCS | Performed by: PHYSICIAN ASSISTANT

## 2024-07-31 PROCEDURE — 1123F ACP DISCUSS/DSCN MKR DOCD: CPT | Performed by: PHYSICIAN ASSISTANT

## 2024-07-31 PROCEDURE — G8420 CALC BMI NORM PARAMETERS: HCPCS | Performed by: PHYSICIAN ASSISTANT

## 2024-07-31 PROCEDURE — G2211 COMPLEX E/M VISIT ADD ON: HCPCS | Performed by: PHYSICIAN ASSISTANT

## 2024-07-31 RX ORDER — TIZANIDINE 2 MG/1
2 TABLET ORAL 3 TIMES DAILY PRN
Qty: 90 TABLET | Refills: 2 | Status: SHIPPED | OUTPATIENT
Start: 2024-07-31

## 2024-07-31 NOTE — PROGRESS NOTES
Name: Majo Escobedo  YOB: 1940  Gender: female  MRN: [id number]    CC:   Chief Complaint   Patient presents with    New Patient     Low back pain          HPI:   Majo Escobedo is a 84 y.o. female with a PMHx of osteoporosis, COPD, previous hip replacements, CKD, fibromyalgia among other comorbidities below.      They present here for evaluation of lower back pain.  She has some posterior right lower back pain/posterior hip pain in the muscular region of the lower back.  No significant radicular features in lower extremities.  This is been going on now probably since she went on a long trip and March of this year few months ago she was on a long flight followed by a long drive.  Her symptoms seem to be aggravated since then.  She cannot work in the yard fairly reasonably, but she has difficulty laying flat in bed at night such as when going to sleep.  She has difficulty performing certain tasks around her home such as specific chores, also with bending and twisting.  No radicular features.  She can take Tylenol.  Has to avoid NSAIDs given her CKD history.  She has been told she has osteoporosis.        Past Medical History Includes:   Past Medical History:   Diagnosis Date    Adverse effect of anesthesia     slow waking    Arthritis     Asthma     MDI prn, last usage 9/10/18    Chronic kidney disease     stage III, Followed by nephrologist- Dr. Tucker     Fibromyalgia     Fibromyalgia     GERD (gastroesophageal reflux disease)     controlled with med    History of tobacco abuse     1/2 ppd x 40 years, quit 2007    Hypertension     med    Rotator cuff tear arthropathy of both shoulders     Sjogrens syndrome (HCC)     Status post left hip replacement 10/9/2015    Status post total left knee replacement 1/8/2016    Thyroid disease     hypo-med   ,   Past Surgical History:   Procedure Laterality Date    ORTHOPEDIC SURGERY  2007    right ANNETTE; left and right    OTHER SURGICAL HISTORY      carpal tunnel,

## 2024-10-28 RX ORDER — TIZANIDINE 2 MG/1
2 TABLET ORAL 3 TIMES DAILY PRN
Qty: 270 TABLET | OUTPATIENT
Start: 2024-10-28

## 2024-11-21 ENCOUNTER — TELEPHONE (OUTPATIENT)
Dept: ORTHOPEDIC SURGERY | Age: 84
End: 2024-11-21

## 2024-11-22 NOTE — TELEPHONE ENCOUNTER
Left patient a VM that she would need to get this from her PCP or Cedric Busby due to [facility] only giving placard to patients who have had a recent sx.

## (undated) DEVICE — SUTURE MCRYL SZ 3-0 L27IN ABSRB UD L24MM PS-1 3/8 CIR PRIM Y936H

## (undated) DEVICE — BANDAGE COMPR SELF ADH 5 YDX4 IN TAN STRL PREMIERPRO LF

## (undated) DEVICE — SOLUTION IV 1000ML 0.9% SOD CHL

## (undated) DEVICE — PRECISION THIN (9.0 X 0.38 X 31.0MM)

## (undated) DEVICE — APPLICATOR BNDG 1MM ADH PREMIERPRO EXOFIN

## (undated) DEVICE — DISPOSABLE BIPOLAR CODE, 12' (3.66 M): Brand: CONMED

## (undated) DEVICE — 4.0MM ROUND FAST CUTTING BUR

## (undated) DEVICE — REM POLYHESIVE ADULT PATIENT RETURN ELECTRODE: Brand: VALLEYLAB

## (undated) DEVICE — SUTURE VCRL SZ 0 L27IN ABSRB UD L36MM CP-1 1/2 CIR REV CUT J267H

## (undated) DEVICE — DRESSING,GAUZE,XEROFORM,CURAD,1"X8",ST: Brand: CURAD

## (undated) DEVICE — STERILE HOOK LOCK LATEX FREE ELASTIC BANDAGE 2INX5YD: Brand: HOOK LOCK™

## (undated) DEVICE — ZIMMER® STERILE DISPOSABLE TOURNIQUET CUFF, DUAL PORT, SINGLE BLADDER, 12 IN. (30 CM)

## (undated) DEVICE — SUTURE ETHBND EXCEL SZ 2 L27IN NONABSORBABLE GRN WHT MO-7 D7485

## (undated) DEVICE — SUTURE VCRL RAPIDE SZ 4-0 L18IN ABSRB UD L19MM PS-2 1/2 CIR VR496

## (undated) DEVICE — SUTURE MCRYL SZ 2-0 L27IN ABSRB UD CP-1 1 L36MM 1/2 CIR REV Y266H

## (undated) DEVICE — AMD ANTIMICROBIAL GAUZE SPONGES,12 PLY USP TYPE VII, 0.2% POLYHEXAMETHYLENE BIGUANIDE HCI (PHMB): Brand: CURITY

## (undated) DEVICE — SUT ETHLN 4-0 18IN PS2 BLK --

## (undated) DEVICE — STOCKINETTE TUBE 9X48 -- MEDICHOICE

## (undated) DEVICE — STRETCH BANDAGE ROLL: Brand: DERMACEA

## (undated) DEVICE — BUTTON SWITCH PENCIL BLADE ELECTRODE, HOLSTER: Brand: EDGE

## (undated) DEVICE — 2000CC GUARDIAN II: Brand: GUARDIAN

## (undated) DEVICE — SURGICAL PROCEDURE PACK BASIC ST FRANCIS

## (undated) DEVICE — (D)PREP SKN CHLRAPRP APPL 26ML -- CONVERT TO ITEM 371833

## (undated) DEVICE — DRAPE,TOP,102X53,STERILE: Brand: MEDLINE

## (undated) DEVICE — CARDINAL HEALTH FLEXIBLE LIGHT HANDLE COVER: Brand: CARDINAL HEALTH

## (undated) DEVICE — INTENDED FOR TISSUE SEPARATION, AND OTHER PROCEDURES THAT REQUIRE A SHARP SURGICAL BLADE TO PUNCTURE OR CUT.: Brand: BARD-PARKER ® STAINLESS STEEL BLADES

## (undated) DEVICE — Device

## (undated) DEVICE — SLING ARM AD ULT